# Patient Record
Sex: FEMALE | Race: BLACK OR AFRICAN AMERICAN | NOT HISPANIC OR LATINO | URBAN - METROPOLITAN AREA
[De-identification: names, ages, dates, MRNs, and addresses within clinical notes are randomized per-mention and may not be internally consistent; named-entity substitution may affect disease eponyms.]

---

## 2020-12-19 ENCOUNTER — INPATIENT (INPATIENT)
Facility: HOSPITAL | Age: 85
LOS: 2 days | Discharge: HOME | End: 2020-12-22
Attending: HOSPITALIST | Admitting: HOSPITALIST
Payer: MEDICARE

## 2020-12-19 VITALS
HEART RATE: 108 BPM | SYSTOLIC BLOOD PRESSURE: 167 MMHG | DIASTOLIC BLOOD PRESSURE: 95 MMHG | TEMPERATURE: 100 F | RESPIRATION RATE: 22 BRPM | OXYGEN SATURATION: 93 % | WEIGHT: 85.1 LBS

## 2020-12-19 DIAGNOSIS — Z89.619 ACQUIRED ABSENCE OF UNSPECIFIED LEG ABOVE KNEE: Chronic | ICD-10-CM

## 2020-12-19 LAB
ALBUMIN SERPL ELPH-MCNC: 3.7 G/DL — SIGNIFICANT CHANGE UP (ref 3.5–5.2)
ALP SERPL-CCNC: 74 U/L — SIGNIFICANT CHANGE UP (ref 30–115)
ALT FLD-CCNC: 21 U/L — SIGNIFICANT CHANGE UP (ref 0–41)
ANION GAP SERPL CALC-SCNC: 11 MMOL/L — SIGNIFICANT CHANGE UP (ref 7–14)
ANION GAP SERPL CALC-SCNC: 11 MMOL/L — SIGNIFICANT CHANGE UP (ref 7–14)
ANION GAP SERPL CALC-SCNC: 12 MMOL/L — SIGNIFICANT CHANGE UP (ref 7–14)
AST SERPL-CCNC: 17 U/L — SIGNIFICANT CHANGE UP (ref 0–41)
BASE EXCESS BLDV CALC-SCNC: 3.2 MMOL/L — HIGH (ref -2–2)
BASOPHILS # BLD AUTO: 0.03 K/UL — SIGNIFICANT CHANGE UP (ref 0–0.2)
BASOPHILS NFR BLD AUTO: 0.2 % — SIGNIFICANT CHANGE UP (ref 0–1)
BILIRUB SERPL-MCNC: 0.7 MG/DL — SIGNIFICANT CHANGE UP (ref 0.2–1.2)
BUN SERPL-MCNC: 36 MG/DL — HIGH (ref 10–20)
BUN SERPL-MCNC: 38 MG/DL — HIGH (ref 10–20)
BUN SERPL-MCNC: 44 MG/DL — HIGH (ref 10–20)
CA-I SERPL-SCNC: 1.17 MMOL/L — SIGNIFICANT CHANGE UP (ref 1.12–1.3)
CALCIUM SERPL-MCNC: 8.2 MG/DL — LOW (ref 8.5–10.1)
CALCIUM SERPL-MCNC: 8.3 MG/DL — LOW (ref 8.5–10.1)
CALCIUM SERPL-MCNC: 9.3 MG/DL — SIGNIFICANT CHANGE UP (ref 8.5–10.1)
CHLORIDE SERPL-SCNC: 122 MMOL/L — HIGH (ref 98–110)
CHLORIDE SERPL-SCNC: 122 MMOL/L — HIGH (ref 98–110)
CHLORIDE SERPL-SCNC: 123 MMOL/L — HIGH (ref 98–110)
CO2 SERPL-SCNC: 22 MMOL/L — SIGNIFICANT CHANGE UP (ref 17–32)
CO2 SERPL-SCNC: 22 MMOL/L — SIGNIFICANT CHANGE UP (ref 17–32)
CO2 SERPL-SCNC: 26 MMOL/L — SIGNIFICANT CHANGE UP (ref 17–32)
CREAT SERPL-MCNC: 0.6 MG/DL — LOW (ref 0.7–1.5)
CREAT SERPL-MCNC: 0.7 MG/DL — SIGNIFICANT CHANGE UP (ref 0.7–1.5)
CREAT SERPL-MCNC: 0.7 MG/DL — SIGNIFICANT CHANGE UP (ref 0.7–1.5)
D DIMER BLD IA.RAPID-MCNC: 1010 NG/ML DDU — HIGH (ref 0–230)
EOSINOPHIL # BLD AUTO: 0 K/UL — SIGNIFICANT CHANGE UP (ref 0–0.7)
EOSINOPHIL NFR BLD AUTO: 0 % — SIGNIFICANT CHANGE UP (ref 0–8)
GAS PNL BLDV: 164 MMOL/L — HIGH (ref 136–145)
GAS PNL BLDV: SIGNIFICANT CHANGE UP
GLUCOSE SERPL-MCNC: 126 MG/DL — HIGH (ref 70–99)
GLUCOSE SERPL-MCNC: 126 MG/DL — HIGH (ref 70–99)
GLUCOSE SERPL-MCNC: 90 MG/DL — SIGNIFICANT CHANGE UP (ref 70–99)
HCO3 BLDV-SCNC: 27 MMOL/L — SIGNIFICANT CHANGE UP (ref 22–29)
HCT VFR BLD CALC: 40.8 % — SIGNIFICANT CHANGE UP (ref 37–47)
HCT VFR BLDA CALC: 35.3 % — SIGNIFICANT CHANGE UP (ref 34–44)
HGB BLD CALC-MCNC: 11.5 G/DL — LOW (ref 14–18)
HGB BLD-MCNC: 12.2 G/DL — SIGNIFICANT CHANGE UP (ref 12–16)
IMM GRANULOCYTES NFR BLD AUTO: 0.7 % — HIGH (ref 0.1–0.3)
LACTATE BLDV-MCNC: 1.4 MMOL/L — SIGNIFICANT CHANGE UP (ref 0.5–1.6)
LACTATE SERPL-SCNC: 1.8 MMOL/L — SIGNIFICANT CHANGE UP (ref 0.7–2)
LDH SERPL L TO P-CCNC: 293 — HIGH (ref 50–242)
LYMPHOCYTES # BLD AUTO: 1.48 K/UL — SIGNIFICANT CHANGE UP (ref 1.2–3.4)
LYMPHOCYTES # BLD AUTO: 9.9 % — LOW (ref 20.5–51.1)
MCHC RBC-ENTMCNC: 26.7 PG — LOW (ref 27–31)
MCHC RBC-ENTMCNC: 29.9 G/DL — LOW (ref 32–37)
MCV RBC AUTO: 89.3 FL — SIGNIFICANT CHANGE UP (ref 81–99)
MONOCYTES # BLD AUTO: 0.59 K/UL — SIGNIFICANT CHANGE UP (ref 0.1–0.6)
MONOCYTES NFR BLD AUTO: 3.9 % — SIGNIFICANT CHANGE UP (ref 1.7–9.3)
NEUTROPHILS # BLD AUTO: 12.78 K/UL — HIGH (ref 1.4–6.5)
NEUTROPHILS NFR BLD AUTO: 85.3 % — HIGH (ref 42.2–75.2)
NRBC # BLD: 0 /100 WBCS — SIGNIFICANT CHANGE UP (ref 0–0)
NT-PROBNP SERPL-SCNC: 2382 PG/ML — HIGH (ref 0–300)
PCO2 BLDV: 38 MMHG — LOW (ref 41–51)
PH BLDV: 7.46 — HIGH (ref 7.26–7.43)
PLATELET # BLD AUTO: 181 K/UL — SIGNIFICANT CHANGE UP (ref 130–400)
PO2 BLDV: 53 MMHG — HIGH (ref 20–40)
POTASSIUM BLDV-SCNC: 3.8 MMOL/L — SIGNIFICANT CHANGE UP (ref 3.3–5.6)
POTASSIUM SERPL-MCNC: 3.9 MMOL/L — SIGNIFICANT CHANGE UP (ref 3.5–5)
POTASSIUM SERPL-MCNC: 4 MMOL/L — SIGNIFICANT CHANGE UP (ref 3.5–5)
POTASSIUM SERPL-MCNC: 4.6 MMOL/L — SIGNIFICANT CHANGE UP (ref 3.5–5)
POTASSIUM SERPL-SCNC: 3.9 MMOL/L — SIGNIFICANT CHANGE UP (ref 3.5–5)
POTASSIUM SERPL-SCNC: 4 MMOL/L — SIGNIFICANT CHANGE UP (ref 3.5–5)
POTASSIUM SERPL-SCNC: 4.6 MMOL/L — SIGNIFICANT CHANGE UP (ref 3.5–5)
PROT SERPL-MCNC: 7.5 G/DL — SIGNIFICANT CHANGE UP (ref 6–8)
RAPID RVP RESULT: SIGNIFICANT CHANGE UP
RBC # BLD: 4.57 M/UL — SIGNIFICANT CHANGE UP (ref 4.2–5.4)
RBC # FLD: 14.9 % — HIGH (ref 11.5–14.5)
SAO2 % BLDV: 89 % — SIGNIFICANT CHANGE UP
SARS-COV-2 RNA SPEC QL NAA+PROBE: SIGNIFICANT CHANGE UP
SODIUM SERPL-SCNC: 155 MMOL/L — HIGH (ref 135–146)
SODIUM SERPL-SCNC: 156 MMOL/L — HIGH (ref 135–146)
SODIUM SERPL-SCNC: 160 MMOL/L — HIGH (ref 135–146)
TROPONIN T SERPL-MCNC: <0.01 NG/ML — SIGNIFICANT CHANGE UP
WBC # BLD: 14.98 K/UL — HIGH (ref 4.8–10.8)
WBC # FLD AUTO: 14.98 K/UL — HIGH (ref 4.8–10.8)

## 2020-12-19 PROCEDURE — 99285 EMERGENCY DEPT VISIT HI MDM: CPT

## 2020-12-19 PROCEDURE — 93010 ELECTROCARDIOGRAM REPORT: CPT

## 2020-12-19 PROCEDURE — 70450 CT HEAD/BRAIN W/O DYE: CPT | Mod: 26

## 2020-12-19 PROCEDURE — 99232 SBSQ HOSP IP/OBS MODERATE 35: CPT

## 2020-12-19 PROCEDURE — 99223 1ST HOSP IP/OBS HIGH 75: CPT | Mod: AI

## 2020-12-19 PROCEDURE — 71045 X-RAY EXAM CHEST 1 VIEW: CPT | Mod: 26

## 2020-12-19 RX ORDER — AZITHROMYCIN 500 MG/1
500 TABLET, FILM COATED ORAL ONCE
Refills: 0 | Status: COMPLETED | OUTPATIENT
Start: 2020-12-19 | End: 2020-12-19

## 2020-12-19 RX ORDER — SODIUM CHLORIDE 9 MG/ML
1000 INJECTION INTRAMUSCULAR; INTRAVENOUS; SUBCUTANEOUS ONCE
Refills: 0 | Status: COMPLETED | OUTPATIENT
Start: 2020-12-19 | End: 2020-12-19

## 2020-12-19 RX ORDER — CEFTRIAXONE 500 MG/1
1000 INJECTION, POWDER, FOR SOLUTION INTRAMUSCULAR; INTRAVENOUS ONCE
Refills: 0 | Status: COMPLETED | OUTPATIENT
Start: 2020-12-19 | End: 2020-12-19

## 2020-12-19 RX ORDER — PANTOPRAZOLE SODIUM 20 MG/1
40 TABLET, DELAYED RELEASE ORAL
Refills: 0 | Status: DISCONTINUED | OUTPATIENT
Start: 2020-12-19 | End: 2020-12-22

## 2020-12-19 RX ORDER — INFLUENZA VIRUS VACCINE 15; 15; 15; 15 UG/.5ML; UG/.5ML; UG/.5ML; UG/.5ML
0.5 SUSPENSION INTRAMUSCULAR ONCE
Refills: 0 | Status: DISCONTINUED | OUTPATIENT
Start: 2020-12-19 | End: 2020-12-22

## 2020-12-19 RX ORDER — SODIUM CHLORIDE 9 MG/ML
1000 INJECTION, SOLUTION INTRAVENOUS
Refills: 0 | Status: DISCONTINUED | OUTPATIENT
Start: 2020-12-19 | End: 2020-12-19

## 2020-12-19 RX ORDER — ALBUTEROL 90 UG/1
1 AEROSOL, METERED ORAL EVERY 4 HOURS
Refills: 0 | Status: DISCONTINUED | OUTPATIENT
Start: 2020-12-19 | End: 2020-12-22

## 2020-12-19 RX ORDER — ACETAMINOPHEN 500 MG
650 TABLET ORAL ONCE
Refills: 0 | Status: DISCONTINUED | OUTPATIENT
Start: 2020-12-19 | End: 2020-12-19

## 2020-12-19 RX ORDER — CHLORHEXIDINE GLUCONATE 213 G/1000ML
1 SOLUTION TOPICAL
Refills: 0 | Status: DISCONTINUED | OUTPATIENT
Start: 2020-12-19 | End: 2020-12-22

## 2020-12-19 RX ORDER — ASPIRIN/CALCIUM CARB/MAGNESIUM 324 MG
1 TABLET ORAL
Qty: 0 | Refills: 0 | DISCHARGE

## 2020-12-19 RX ORDER — FUROSEMIDE 40 MG
1 TABLET ORAL
Qty: 0 | Refills: 0 | DISCHARGE

## 2020-12-19 RX ORDER — FLUTICASONE PROPIONATE 220 MCG
1 AEROSOL WITH ADAPTER (GRAM) INHALATION
Qty: 0 | Refills: 0 | DISCHARGE

## 2020-12-19 RX ORDER — ASPIRIN/CALCIUM CARB/MAGNESIUM 324 MG
81 TABLET ORAL DAILY
Refills: 0 | Status: DISCONTINUED | OUTPATIENT
Start: 2020-12-19 | End: 2020-12-22

## 2020-12-19 RX ORDER — OMEPRAZOLE 10 MG/1
1 CAPSULE, DELAYED RELEASE ORAL
Qty: 0 | Refills: 0 | DISCHARGE

## 2020-12-19 RX ORDER — ACETAMINOPHEN 500 MG
650 TABLET ORAL ONCE
Refills: 0 | Status: COMPLETED | OUTPATIENT
Start: 2020-12-19 | End: 2020-12-19

## 2020-12-19 RX ADMIN — Medication 650 MILLIGRAM(S): at 06:40

## 2020-12-19 RX ADMIN — CHLORHEXIDINE GLUCONATE 1 APPLICATION(S): 213 SOLUTION TOPICAL at 17:01

## 2020-12-19 RX ADMIN — AZITHROMYCIN 255 MILLIGRAM(S): 500 TABLET, FILM COATED ORAL at 12:06

## 2020-12-19 RX ADMIN — CEFTRIAXONE 100 MILLIGRAM(S): 500 INJECTION, POWDER, FOR SOLUTION INTRAMUSCULAR; INTRAVENOUS at 07:05

## 2020-12-19 RX ADMIN — SODIUM CHLORIDE 1000 MILLILITER(S): 9 INJECTION INTRAMUSCULAR; INTRAVENOUS; SUBCUTANEOUS at 05:14

## 2020-12-19 RX ADMIN — SODIUM CHLORIDE 50 MILLILITER(S): 9 INJECTION, SOLUTION INTRAVENOUS at 14:55

## 2020-12-19 NOTE — ED PROVIDER NOTE - ATTENDING CONTRIBUTION TO CARE
pt bib family for possible aspiration, choking episode 2 days ago, dec mental status.  febrile.   pt non verbal, lethargic, responds to tactile stim. no signs of trauma. b/l coarse breath sounds. rrr. ab soft. nd. b/l AKA.     will get labs, imaging, ekg. covid swab.

## 2020-12-19 NOTE — ED ADULT NURSE NOTE - NSIMPLEMENTINTERV_GEN_ALL_ED
Implemented All Fall Risk Interventions:  [place] to call system. Call bell, personal items and telephone within reach. Instruct patient to call for assistance. Room bathroom lighting operational. Non-slip footwear when patient is off stretcher. Physically safe environment: no spills, clutter or unnecessary equipment. Stretcher in lowest position, wheels locked, appropriate side rails in place. Provide visual cue, wrist band, yellow gown, etc. Monitor gait and stability. Monitor for mental status changes and reorient to person, place, and time. Review medications for side effects contributing to fall risk. Reinforce activity limits and safety measures with patient and family.

## 2020-12-19 NOTE — PHARMACOTHERAPY INTERVENTION NOTE - COMMENTS
Spoke with MD. Patient has had a unknown reaction to penicillin in the past. Since Ceftriaxone is a 3rd generation cephalosporin, MD is ok with giving ceftriaxone

## 2020-12-19 NOTE — H&P ADULT - HISTORY OF PRESENT ILLNESS
89 years old Female w/ PMhx of COPD, remote hx of brain tumor, b/l AKA presents with altered mental status from home.   Family notes 2 days ago, she seemed to have a choking episode. Since then, she has been waxing and waning in mental status, but they note she got worse today, gurgling and developing a fever. They deny vomiting/diarrhea, patient is now not talking, they note previously she sings/talks with them.    In the ED, Temp 101.2, , /95, RR 22 saturating 93% on RA and titrated to 96% w/ 3L NC. Labs significant for elevated WBC 14.98, Na 160, proBNP 2382, CXR shows bilateral lung opacities with left lower lobe consolidation. CTH shows 5 cm partially calcified retroclival soft tissue mass with associated bony erosion of the clivus and mass effect upon the brainstem. This may contribute to the ventricular dilatation. The differential for this finding includes chordoma versus meningioma. Pt received 1L NS bolus, ceftriaxone / azithromycin, tylenol for fever in ED. Neurosurgery consulted and recommended Keppra and MRI of brain. Pt to be admitted to medicine for further workup.   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   As per family, pt has been lethargic and not eating well since yesterday prompting the family to bring the patient to the hospital. At baseline pt is AAO x2, is awake, alert, speaks back to family members, watches TV but no oriented to time and place. Pt has a visiting home doctor who visits the patient once every month and visiting nurse who visits the patient once every week. At baseline pt tolerated regular meals and ensure supplements were added by visiting nurse, but recently family has noticed difficulty with swallowing and wanted to give soft puree diet.   Family is unsure about brain mass history but reports it was "benign mass" although patient has history of malignant brain tumor in her siblings. Also are not fully sure about the reason for b/l above knee amputations as per them the reason was "erosion of metallic prosthesis in in right ankle leading to gangrene that spread to left ankle ending up in b/l AKA".   Family denies any observed fever, SOB. n/v/d, weight changes or chest pain     In the ED, Temp 101.2, , /95, RR 22 saturating 93% on RA and titrated to 96% w/ 3L NC. Labs significant for elevated WBC 14.98, Na 160, proBNP 2382, CXR shows bilateral lung opacities with left lower lobe consolidation. CTH shows 5 cm partially calcified retroclival soft tissue mass with associated bony erosion of the clivus and mass effect upon the brainstem. This may contribute to the ventricular dilatation. The differential for this finding includes chordoma versus meningioma. Pt received 1L NS bolus, ceftriaxone / azithromycin, tylenol for fever in ED. Neurosurgery consulted and recommended Keppra and MRI of brain. Pt to be admitted to medicine for further workup.

## 2020-12-19 NOTE — H&P ADULT - NSHPLABSRESULTS_GEN_ALL_CORE
(12-19 @ 02:44)                      12.2  14.98 )-----------( 181                 40.8    Neutrophils = 12.78 (85.3%)  Lymphocytes = 1.48 (9.9%)  Eosinophils = 0.00 (0.0%)  Basophils = 0.03 (0.2%)  Monocytes = 0.59 (3.9%)  Bands = --%    12-19    160<H>  |  122<H>  |  44<H>  ----------------------------<  126<H>  4.6   |  26  |  0.7    Ca    9.3      19 Dec 2020 02:44    TPro  7.5  /  Alb  3.7  /  TBili  0.7  /  DBili  x   /  AST  17  /  ALT  21  /  AlkPhos  74  12-19      CARDIAC MARKERS ( 19 Dec 2020 02:44 )  Trop <0.01 ng/mL / CK x     / CKMB x           RVP:(12-19 @ 02:54)  [facility]      Venous Blood Gas:  12-19 @ 03:57  7.46/38/53/27/89  VBG Lactate: 1.4        < from: Xray Chest 1 View-PORTABLE IMMEDIATE (12.19.20 @ 05:13) >    mpression:    Bilateral opacities..    < end of copied text >    < from: CT Head No Cont (12.19.20 @ 05:00) >    5 cm partially calcified retroclival soft tissue mass with associated bony erosion of the clivus and mass effect upon the brainstem. This may contribute to the ventricular dilatation. The differential for this finding includes chordoma versus meningioma. Further evaluation with contrast-enhanced MRI is recommended.    Partially opacified bilateral mastoid air cells.    < end of copied text >

## 2020-12-19 NOTE — CONSULT NOTE ADULT - ATTENDING COMMENTS
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 lesion has been known to family for many years. No historical imaging for comparison.  Surgical intervention is not indicated in this medically fragile 89 year old. Grandaughter is relieved and in agreement. She would like to get her grandmother home to care for her at home ASAP. We are in agreement that MRI is unnecessary given no planned surgical intervention. This was relayed to resident. Reconsult as needed.

## 2020-12-19 NOTE — ED PROVIDER NOTE - OBJECTIVE STATEMENT
89F hx COPD, remote hx of brain tumor, b/l AKA presents with altered mental status from home. Family notes 2 days ago, she seemed to have a choking episode. Since then, she has been waxing and waning in mental status, but they note she got worse today, gurgling and developing a fever. They deny vomiting/diarrhea, patient is now not talking, they note previously she sings/talks with them.

## 2020-12-19 NOTE — ED PROVIDER NOTE - CARE PLAN
Principal Discharge DX:	Aspiration pneumonia  Secondary Diagnosis:	Altered mental status  Secondary Diagnosis:	Brain mass

## 2020-12-19 NOTE — CONSULT NOTE ADULT - ASSESSMENT
89 y.o F with CTH findings of 5 cm partially calcified retroclival soft tissue mass with associated bony erosion of the clivus and mass effect upon the brainstem. This may contribute to the ventricular dilatation. The differential for this finding includes chordoma versus meningioma.        Plan:  Family want to proceed with MRI evaluation, but refusing surgical intervention at this time   Please obtain MRI of the head w/wo IVC  Keppra 1000 mg IV x onec cont. 500 mg BID   Neurology r/o seizure activity   NSGY attending will F/U

## 2020-12-19 NOTE — H&P ADULT - NSHPPHYSICALEXAM_GEN_ALL_CORE
PHYSICAL EXAM:  GENERAL: NAD, lying in bed comfortably  HEAD:  Atraumatic, Normocephalic  EYES: EOMI, PERRLA, conjunctiva and sclera clear  ENT: Moist mucous membranes  NECK: Supple, No JVD  CHEST/LUNG: Clear to auscultation bilaterally; No rales, rhonchi, wheezing, or rubs. Unlabored respirations  HEART: Regular rate and rhythm; No murmurs, rubs, or gallops  ABDOMEN: Bowel sounds present; Soft, Nontender, Nondistended. No hepatomegally  EXTREMITIES:  2+ Peripheral Pulses, brisk capillary refill. No clubbing, cyanosis, or edema  NERVOUS SYSTEM:  Alert & Oriented X3, speech clear. No deficits   MSK: FROM all 4 extremities, full and equal strength  SKIN: No rashes or lesions PHYSICAL EXAM:  GENERAL: NAD, lying in bed comfortably  HEAD:  Atraumatic, Normocephalic  EYES: EOMI, PERRLA, conjunctiva and sclera clear  ENT: Moist mucous membranes  NECK: Supple, No JVD  CHEST/LUNG: decreased bs in b/l bases  HEART: Regular rate and rhythm; No murmurs, rubs, or gallops  ABDOMEN: Bowel sounds present; Soft, Nontender, Nondistended. No hepatomegally  EXTREMITIES:  AKA  NERVOUS SYSTEM:  Alert & Oriented X1, speech clear. No deficits   SKIN: No rashes or lesions

## 2020-12-19 NOTE — ED ADULT TRIAGE NOTE - CHIEF COMPLAINT QUOTE
pt biba for possible aspiration. as per family member the patient spit up and now she sounds all congested. pt is non verbal which is baseline for patient.

## 2020-12-19 NOTE — H&P ADULT - ATTENDING COMMENTS
**Hx and physical limited due to AMS. Supplemental information obtained from house staff, EMR, and family (granddaughter; Crystal) at bedside.     89 YO F with a PMH of COPD (not on home O2), obesity, HF (unknown type), remote hx of benign brain tumor (unknown type), and b/l AKA who was BIBEMS with her family for eval of AMS for the past x 2 days. Associated with decreased PO intake. Family denies any cough, fevers/chills, or N/V/D. No COVID contact (family "doesn't leave home"). In the ED, Chest X-Ray showed B/L opacities. Cultures sent, started on IVFs/IV ABXs (Ceftriaxone/Azithro). Na noted to be 160, given IVFs. CTH showed an retroclival mass. Neurosurgery consulted and states no need for surgical intervention and due to chronicity of mass, no need for MRI. COVID swab was negative.     Physical exam shows obese pt in NAD. VSS, afebrile, not hypoxic on 2L NC. Dry mucous membranes. A&Ox0, Pt is alert but will not speak to me, only to her granddaughter at bedside. Inable to assess neuro exam. CTA B/L with no W/C/R. RRR, no M/G/R. ABD is soft and non-tender, normoactive BSs. LEs with B/L AKAs. No rashes. Labs and radiology as above.     Metabolic encephalopathy likely from aspiration pneumonia, less likely COVID; sepsis present on admission. IV ABXs (Ceftriaxone/Azithromycin). IVFs (LR). FU cultures. Anti-tussives PRN. Send Procal/CRP. ASpiration precautions. Speech/Swallow eval  -Family would like pt discharged home ASAP, I informed them that she will likely need IV ABXs if she is unable to swallow.   -Might need to place midline and set up home VNS    Hypernatremia from decreased PO intake. IVFs (LR). Repeat BMP in the AM. Send UA and urine studies.     Retroclival mass, chronic. Family knows about this mass and is not interested in pursuing it further. Neurosurgery has signed off the case. No MRI.     Hx of COPD (not on home O2), obesity, HF (unknown type), remote hx of benign brain tumor (unknown type), and b/l AKA. Restart home meds, hold home lasix. DVT PPX. Inform PCP of pt's admission to hospital. My note supersedes the residents note.

## 2020-12-19 NOTE — ED ADULT NURSE NOTE - OBJECTIVE STATEMENT
89yr old female presents w/ grandduaghter due to change in mental status. pt used to sign and be vocal as of yesterday pt is nonverbal and somnolent.

## 2020-12-19 NOTE — ED PROVIDER NOTE - NS ED ROS FT
Constitutional: (+) fever  Eyes/ENT: (-) blurry vision, (-) epistaxis  Cardiovascular: (-) chest pain, (-) syncope  Respiratory: (-) cough, (-) shortness of breath  Gastrointestinal: (-) vomiting, (-) diarrhea  Musculoskeletal: (-) neck pain, (-) back pain, (-) joint pain  Integumentary: (-) rash, (-) edema  Neurological: (-) headache, (+) altered mental status  Psychiatric: (-) hallucinations  Allergic/Immunologic: (-) pruritus

## 2020-12-19 NOTE — ED PROVIDER NOTE - PHYSICAL EXAMINATION
Vital Signs: I have reviewed the initial vital signs.  Constitutional: well-nourished, appears stated age, no acute distress.  HEENT: Airway patent, protected. No pooling of saliva, pupils 3mm bilaterally, sluggish.   CV: regular rate, regular rhythm, well-perfused extremities, 2+ b/l DP and radial pulses equal.  Lungs: audible crackles, no increased WOB.  ABD: soft, NTND, no guarding or rebound, no pulsatile mass, no hernias.   INTEG: Skin warm, dry, no rash.  NEURO: A&Ox0, moving all extremities to noxious stimuli, nonverbal  PSYCH: Calm, cooperative, normal affect and interaction.

## 2020-12-19 NOTE — H&P ADULT - ASSESSMENT
# Acute metabolic encephalopathy  # DDX: Brain mass, sepsis secondary to aspiration PNA, Hypernatremia     # Brain mass  - CTH shows 5 cm partially calcified retroclival soft tissue mass with associated bony erosion of the clivus and mass effect upon the brainstem. This may contribute to the ventricular dilatation. The differential for this finding includes chordoma versus meningioma. Further evaluation with contrast-enhanced MRI is recommended.  - start keppra and decardron  - c/s neurology  - f/u neurosurgery    # Hypernatremia possibly secondary to hypovolemia as elevated BUN  - urine osm, U Na, Cr, Uk, FeNa  - calculated water deficit is 1.1 L  - chronic - lower by 8-10mmol/day, via water deficit  plus 50cc/hour forinsensible losses  - strict I and Os  - acute 1mmol/hr for first 6/8 hours  - D5w  - thiazide diuretcis if NDI,   - decrease salt intake for hypervolemic  - f/u BMP    # COPD  # hx of AKA? PVD?    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     # Acute metabolic encephalopathy  # DDX: Brain mass, sepsis secondary to aspiration PNA, Hypernatremia     # Aspiration PNA / sepsis  - SIRS +, CXR shows bilateral lung opacities with left lower lobe consolidation.  - rapid covid 12/9 negative  - start levofloxacin 750mg IV q24 as allergic to penicillin  - f/u procalcitonin, LDH, CRP, D-Dimer  - order UA, UCX, BCX  - consult ID    # Chronic brain mass - DDX chordoma vs meningioma  - CTH shows 5 cm partially calcified retroclival soft tissue mass with associated bony erosion of the clivus and mass effect upon the brainstem. This may contribute to the ventricular dilatation. The differential for this finding includes chordoma versus meningioma. Further evaluation with contrast-enhanced MRI is recommended.  - spoke w/ NSX attending - family doesn't want any procedure so no MRI or keppra at the moment  - f/u neurosurgery  - EEG to r/o seizure,     # Hypernatremia possibly secondary to hypovolemia as elevated BUN  - unsure if acute vs chronic, no previous records available.   - urine osm, U Na, Cr, Uk, FeNa  - calculated water deficit is 1.1 L  - strict I and Os  - D5w @ 50cc, goal is to lower by 8mmol / day  - stop diuretics, pt takes lasix at home  - f/u BMP    # HF w/ unknow ejection fraction ; CXR b/l opacities L > R, BNP elevated, looks dry on exam. will hold lasix for now due to hypernatremia.   # COPD - c/w home inhalers  # hx of AKA - PVD - c/w home ASA    # Diet: NPO, speech and swallow  # DVT Prophylaxis: n/a for now as brain mass  # GI Prophylaxis: Protonix  # Activity: IAT  # IV Fluids: D5W  # Dispo: Acute  # Code Status: Fullcode, d/w daughter Comfort, she wants fullcode.

## 2020-12-20 LAB
ALBUMIN SERPL ELPH-MCNC: 3.1 G/DL — LOW (ref 3.5–5.2)
ALP SERPL-CCNC: 67 U/L — SIGNIFICANT CHANGE UP (ref 30–115)
ALT FLD-CCNC: 13 U/L — SIGNIFICANT CHANGE UP (ref 0–41)
ANION GAP SERPL CALC-SCNC: 10 MMOL/L — SIGNIFICANT CHANGE UP (ref 7–14)
ANION GAP SERPL CALC-SCNC: 11 MMOL/L — SIGNIFICANT CHANGE UP (ref 7–14)
ANION GAP SERPL CALC-SCNC: 11 MMOL/L — SIGNIFICANT CHANGE UP (ref 7–14)
ANION GAP SERPL CALC-SCNC: 13 MMOL/L — SIGNIFICANT CHANGE UP (ref 7–14)
APTT BLD: 24.8 SEC — LOW (ref 27–39.2)
AST SERPL-CCNC: 9 U/L — SIGNIFICANT CHANGE UP (ref 0–41)
BASOPHILS # BLD AUTO: 0.02 K/UL — SIGNIFICANT CHANGE UP (ref 0–0.2)
BASOPHILS NFR BLD AUTO: 0.1 % — SIGNIFICANT CHANGE UP (ref 0–1)
BILIRUB SERPL-MCNC: 0.5 MG/DL — SIGNIFICANT CHANGE UP (ref 0.2–1.2)
BUN SERPL-MCNC: 27 MG/DL — HIGH (ref 10–20)
BUN SERPL-MCNC: 33 MG/DL — HIGH (ref 10–20)
BUN SERPL-MCNC: 34 MG/DL — HIGH (ref 10–20)
BUN SERPL-MCNC: 34 MG/DL — HIGH (ref 10–20)
CALCIUM SERPL-MCNC: 8.2 MG/DL — LOW (ref 8.5–10.1)
CALCIUM SERPL-MCNC: 8.5 MG/DL — SIGNIFICANT CHANGE UP (ref 8.5–10.1)
CALCIUM SERPL-MCNC: 8.6 MG/DL — SIGNIFICANT CHANGE UP (ref 8.5–10.1)
CALCIUM SERPL-MCNC: 8.7 MG/DL — SIGNIFICANT CHANGE UP (ref 8.5–10.1)
CHLORIDE SERPL-SCNC: 118 MMOL/L — HIGH (ref 98–110)
CHLORIDE SERPL-SCNC: 124 MMOL/L — HIGH (ref 98–110)
CHLORIDE SERPL-SCNC: 125 MMOL/L — HIGH (ref 98–110)
CHLORIDE SERPL-SCNC: 126 MMOL/L — HIGH (ref 98–110)
CHOLEST SERPL-MCNC: 180 MG/DL — SIGNIFICANT CHANGE UP
CO2 SERPL-SCNC: 22 MMOL/L — SIGNIFICANT CHANGE UP (ref 17–32)
CO2 SERPL-SCNC: 22 MMOL/L — SIGNIFICANT CHANGE UP (ref 17–32)
CO2 SERPL-SCNC: 23 MMOL/L — SIGNIFICANT CHANGE UP (ref 17–32)
CO2 SERPL-SCNC: 24 MMOL/L — SIGNIFICANT CHANGE UP (ref 17–32)
CREAT SERPL-MCNC: 0.5 MG/DL — LOW (ref 0.7–1.5)
CREAT SERPL-MCNC: 0.6 MG/DL — LOW (ref 0.7–1.5)
CRP SERPL-MCNC: 14.54 MG/DL — HIGH (ref 0–0.4)
EOSINOPHIL # BLD AUTO: 0.04 K/UL — SIGNIFICANT CHANGE UP (ref 0–0.7)
EOSINOPHIL NFR BLD AUTO: 0.3 % — SIGNIFICANT CHANGE UP (ref 0–8)
GLUCOSE SERPL-MCNC: 113 MG/DL — HIGH (ref 70–99)
GLUCOSE SERPL-MCNC: 89 MG/DL — SIGNIFICANT CHANGE UP (ref 70–99)
GLUCOSE SERPL-MCNC: 90 MG/DL — SIGNIFICANT CHANGE UP (ref 70–99)
GLUCOSE SERPL-MCNC: 93 MG/DL — SIGNIFICANT CHANGE UP (ref 70–99)
HCT VFR BLD CALC: 33.9 % — LOW (ref 37–47)
HDLC SERPL-MCNC: 52 MG/DL — SIGNIFICANT CHANGE UP
HGB BLD-MCNC: 10.1 G/DL — LOW (ref 12–16)
IMM GRANULOCYTES NFR BLD AUTO: 0.6 % — HIGH (ref 0.1–0.3)
INR BLD: 1.41 RATIO — HIGH (ref 0.65–1.3)
LIPID PNL WITH DIRECT LDL SERPL: 109 MG/DL — HIGH
LYMPHOCYTES # BLD AUTO: 1.3 K/UL — SIGNIFICANT CHANGE UP (ref 1.2–3.4)
LYMPHOCYTES # BLD AUTO: 9.1 % — LOW (ref 20.5–51.1)
MAGNESIUM SERPL-MCNC: 2.3 MG/DL — SIGNIFICANT CHANGE UP (ref 1.8–2.4)
MCHC RBC-ENTMCNC: 27.2 PG — SIGNIFICANT CHANGE UP (ref 27–31)
MCHC RBC-ENTMCNC: 29.8 G/DL — LOW (ref 32–37)
MCV RBC AUTO: 91.1 FL — SIGNIFICANT CHANGE UP (ref 81–99)
MONOCYTES # BLD AUTO: 0.61 K/UL — HIGH (ref 0.1–0.6)
MONOCYTES NFR BLD AUTO: 4.3 % — SIGNIFICANT CHANGE UP (ref 1.7–9.3)
NEUTROPHILS # BLD AUTO: 12.26 K/UL — HIGH (ref 1.4–6.5)
NEUTROPHILS NFR BLD AUTO: 85.6 % — HIGH (ref 42.2–75.2)
NON HDL CHOLESTEROL: 128 MG/DL — SIGNIFICANT CHANGE UP
NRBC # BLD: 0 /100 WBCS — SIGNIFICANT CHANGE UP (ref 0–0)
PLATELET # BLD AUTO: 128 K/UL — LOW (ref 130–400)
POTASSIUM SERPL-MCNC: 3.4 MMOL/L — LOW (ref 3.5–5)
POTASSIUM SERPL-MCNC: 3.5 MMOL/L — SIGNIFICANT CHANGE UP (ref 3.5–5)
POTASSIUM SERPL-MCNC: 3.8 MMOL/L — SIGNIFICANT CHANGE UP (ref 3.5–5)
POTASSIUM SERPL-MCNC: 3.9 MMOL/L — SIGNIFICANT CHANGE UP (ref 3.5–5)
POTASSIUM SERPL-SCNC: 3.4 MMOL/L — LOW (ref 3.5–5)
POTASSIUM SERPL-SCNC: 3.5 MMOL/L — SIGNIFICANT CHANGE UP (ref 3.5–5)
POTASSIUM SERPL-SCNC: 3.8 MMOL/L — SIGNIFICANT CHANGE UP (ref 3.5–5)
POTASSIUM SERPL-SCNC: 3.9 MMOL/L — SIGNIFICANT CHANGE UP (ref 3.5–5)
PROCALCITONIN SERPL-MCNC: 0.12 NG/ML — HIGH (ref 0.02–0.1)
PROT SERPL-MCNC: 6 G/DL — SIGNIFICANT CHANGE UP (ref 6–8)
PROTHROM AB SERPL-ACNC: 16.2 SEC — HIGH (ref 9.95–12.87)
RBC # BLD: 3.72 M/UL — LOW (ref 4.2–5.4)
RBC # FLD: 14.9 % — HIGH (ref 11.5–14.5)
SARS-COV-2 RNA SPEC QL NAA+PROBE: SIGNIFICANT CHANGE UP
SODIUM SERPL-SCNC: 150 MMOL/L — HIGH (ref 135–146)
SODIUM SERPL-SCNC: 159 MMOL/L — HIGH (ref 135–146)
SODIUM SERPL-SCNC: 160 MMOL/L — HIGH (ref 135–146)
SODIUM SERPL-SCNC: 160 MMOL/L — HIGH (ref 135–146)
TRIGL SERPL-MCNC: 78 MG/DL — SIGNIFICANT CHANGE UP
WBC # BLD: 14.31 K/UL — HIGH (ref 4.8–10.8)
WBC # FLD AUTO: 14.31 K/UL — HIGH (ref 4.8–10.8)

## 2020-12-20 PROCEDURE — 99233 SBSQ HOSP IP/OBS HIGH 50: CPT

## 2020-12-20 RX ORDER — SODIUM CHLORIDE 9 MG/ML
1000 INJECTION, SOLUTION INTRAVENOUS
Refills: 0 | Status: DISCONTINUED | OUTPATIENT
Start: 2020-12-20 | End: 2020-12-20

## 2020-12-20 RX ORDER — CEFTRIAXONE 500 MG/1
1000 INJECTION, POWDER, FOR SOLUTION INTRAMUSCULAR; INTRAVENOUS EVERY 24 HOURS
Refills: 0 | Status: DISCONTINUED | OUTPATIENT
Start: 2020-12-21 | End: 2020-12-22

## 2020-12-20 RX ORDER — CEFTRIAXONE 500 MG/1
INJECTION, POWDER, FOR SOLUTION INTRAMUSCULAR; INTRAVENOUS
Refills: 0 | Status: DISCONTINUED | OUTPATIENT
Start: 2020-12-20 | End: 2020-12-22

## 2020-12-20 RX ORDER — AZITHROMYCIN 500 MG/1
500 TABLET, FILM COATED ORAL EVERY 24 HOURS
Refills: 0 | Status: DISCONTINUED | OUTPATIENT
Start: 2020-12-20 | End: 2020-12-22

## 2020-12-20 RX ORDER — CEFTRIAXONE 500 MG/1
1000 INJECTION, POWDER, FOR SOLUTION INTRAMUSCULAR; INTRAVENOUS ONCE
Refills: 0 | Status: COMPLETED | OUTPATIENT
Start: 2020-12-20 | End: 2020-12-20

## 2020-12-20 RX ORDER — SODIUM CHLORIDE 9 MG/ML
1000 INJECTION, SOLUTION INTRAVENOUS
Refills: 0 | Status: DISCONTINUED | OUTPATIENT
Start: 2020-12-20 | End: 2020-12-21

## 2020-12-20 RX ORDER — POTASSIUM CHLORIDE 20 MEQ
40 PACKET (EA) ORAL ONCE
Refills: 0 | Status: DISCONTINUED | OUTPATIENT
Start: 2020-12-20 | End: 2020-12-21

## 2020-12-20 RX ADMIN — AZITHROMYCIN 255 MILLIGRAM(S): 500 TABLET, FILM COATED ORAL at 13:20

## 2020-12-20 RX ADMIN — PANTOPRAZOLE SODIUM 40 MILLIGRAM(S): 20 TABLET, DELAYED RELEASE ORAL at 06:33

## 2020-12-20 RX ADMIN — CEFTRIAXONE 100 MILLIGRAM(S): 500 INJECTION, POWDER, FOR SOLUTION INTRAMUSCULAR; INTRAVENOUS at 13:20

## 2020-12-20 RX ADMIN — SODIUM CHLORIDE 50 MILLILITER(S): 9 INJECTION, SOLUTION INTRAVENOUS at 09:41

## 2020-12-20 RX ADMIN — CHLORHEXIDINE GLUCONATE 1 APPLICATION(S): 213 SOLUTION TOPICAL at 06:33

## 2020-12-20 RX ADMIN — SODIUM CHLORIDE 75 MILLILITER(S): 9 INJECTION, SOLUTION INTRAVENOUS at 13:21

## 2020-12-20 NOTE — PROGRESS NOTE ADULT - SUBJECTIVE AND OBJECTIVE BOX
EDDIE ECHOLS 89y Female  MRN#: [id number]   Hospital Day: 1d    SUBJECTIVE  Patient is a 89y old Female who presents with a chief complaint of Altered mental status (19 Dec 2020 11:08)  Currently admitted to medicine with the primary diagnosis of Aspiration pneumonia      INTERVAL HPI AND OVERNIGHT EVENTS:  Patient was examined and seen at bedside. This morning she is resting comfortably in bed and reports no issues or overnight events.    REVIEW OF SYMPTOMS: Unable to obtain due to patient's mental status      OBJECTIVE  PAST MEDICAL & SURGICAL HISTORY  Brain mass    COPD, mild    Above-knee amputee      ALLERGIES:  penicillin (Anaphylaxis)    MEDICATIONS:  STANDING MEDICATIONS  aspirin  chewable 81 milliGRAM(s) Oral daily  chlorhexidine 4% Liquid 1 Application(s) Topical <User Schedule>  dextrose 5%. 1000 milliLiter(s) IV Continuous <Continuous>  influenza   Vaccine 0.5 milliLiter(s) IntraMuscular once  levoFLOXacin IVPB 750 milliGRAM(s) IV Intermittent every 24 hours  pantoprazole    Tablet 40 milliGRAM(s) Oral before breakfast    PRN MEDICATIONS  ALBUTerol    90 MICROgram(s) HFA Inhaler 1 Puff(s) Inhalation every 4 hours PRN      VITAL SIGNS: Last 24 Hours  T(C): 37.3 (20 Dec 2020 05:01), Max: 37.4 (19 Dec 2020 07:40)  T(F): 99.2 (20 Dec 2020 05:01), Max: 99.4 (19 Dec 2020 07:40)  HR: 91 (20 Dec 2020 05:01) (91 - 96)  BP: 108/59 (20 Dec 2020 05:01) (92/52 - 108/59)  BP(mean): --  RR: 19 (20 Dec 2020 05:01) (18 - 19)  SpO2: 97% (19 Dec 2020 10:23) (97% - 99%)    LABS:                        10.1   14.31 )-----------( 128      ( 20 Dec 2020 05:50 )             33.9     12-20    160<H>  |  125<H>  |  34<H>  ----------------------------<  90  3.5   |  24  |  0.6<L>    Ca    8.6      20 Dec 2020 05:50  Mg     2.3     12-20    TPro  6.0  /  Alb  3.1<L>  /  TBili  0.5  /  DBili  x   /  AST  9   /  ALT  13  /  AlkPhos  67  12-20    PT/INR - ( 20 Dec 2020 05:50 )   PT: 16.20 sec;   INR: 1.41 ratio         PTT - ( 20 Dec 2020 05:50 )  PTT:24.8 sec          CARDIAC MARKERS ( 19 Dec 2020 02:44 )  x     / <0.01 ng/mL / x     / x     / x          RADIOLOGY:      PHYSICAL EXAM:  CONSTITUTIONAL: AMS   HEAD: Atraumatic, normocephalic  EYES: EOM intact, PERRLA, conjunctiva and sclera clear  PULMONARY: Clear to auscultation bilaterally  CARDIOVASCULAR: Regular rate and rhythm  GASTROINTESTINAL: Soft, non-tender, non-distended  MUSCULOSKELETAL:  no edema  NEUROLOGY: unable to obtain   SKIN: No rashes or lesions; warm and dry    ASSESSMENT & PLAN  89 years old Female with PMhx of COPD (not on home O2), remote hx of benign brain tumor (unknown type), 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     # Acute metabolic encephalopathy  # DDX: Brain mass, sepsis secondary to aspiration PNA, Hypernatremia   # Aspiration PNA / sepsis  - SIRS +, CXR shows bilateral lung opacities with left lower lobe consolidation.  - rapid covid 12/9 negative  - start levofloxacin 750mg IV q24 as allergic to penicillin  - f/u procalcitonin,  CRP  Lactate Dehydrogenase, Serum (12.19.20 @ 16:00)    Lactate Dehydrogenase, Serum: 293: Hemolyzed. Interpret with caution  -D-Dimer Assay, Quantitative: 1010: Manufacturers recommended Cut off for VTE is 230 ng/ml D-DU ng/mL DDU (12.19.20 @ 16:00)  - Blood cultures collected, pending results  - pending collection UA/Urine culture   - ID consult pending     # Chronic brain mass - DDX chordoma vs meningioma  - CTH shows 5 cm partially calcified retroclival soft tissue mass with associated bony erosion of the clivus and mass effect upon the brainstem. This may contribute to the ventricular dilatation. The differential for this finding includes chordoma versus meningioma. Further evaluation with contrast-enhanced MRI is recommended.  - spoke w/ NSX attending - family doesn't want any procedure so no MRI or keppra at the moment  - EEG to r/o seizure  - monitor hypernatremia on D5W     # Hypernatremia possibly secondary to hypovolemia as elevated BUN  - unsure if acute vs chronic, no previous records available.   - urine osm, U Na, Cr, Uk, FeNa  - calculated water deficit is 1.1 L  - strict I and Os  - D5w @ 50cc, goal is to lower by 8mmol / day, 160 --> 155 (fluids stopped)--> 156 --> 160 (fluids restarted)  - f/u BMP q8hr     # HF w/ unknown ejection fraction ; CXR b/l opacities L > R, BNP elevated, looks dry on exam. will hold lasix for now due to hypernatremia.   # COPD - c/w home inhalers  # hx of AKA - PVD - c/w home ASA     Diet: NPO, speech and swallow   DVT Prophylaxis: n/a for now as brain mass   GI Prophylaxis: Protonix   Activity: IAT   IV Fluids: D5W  Dispo: Acute  Code Status: Fullcode, d/w daughter Comfort, she wants fullcode.

## 2020-12-20 NOTE — PROGRESS NOTE ADULT - ATTENDING COMMENTS
Patient seen and examined    #Aspiration Pneumonia, sepsis present on admission: ID recommendations noted, aspiration precautions, awaiting speech and swallow for modified diet and precautions     #Metabolic encephalopathy likely from aspiration pneumonia vs Hypernatremia: continue maintenance fluid until speech and swallow seen, free water deficit of 2.5L, free water supplement 400cc/Q4H, check 20:00 BMP    #Chronic Retroclival mass: no further workup as per family and neurosurgery     #COPD (not on home O2) stable, Unspecified CHF: strict intake and outputs, no sign of overt volume overload, lasix on hold     Disposition: Acute Patient seen and examined, discussed case with patient's daughter, Comfort, over the phone, patient alert and oriented to self on interview, denies any complaints.    #Aspiration Pneumonia, sepsis present on admission: ID recommendations noted, aspiration precautions, awaiting speech and swallow for modified diet and precautions     #Metabolic encephalopathy likely from aspiration pneumonia vs Hypernatremia: continue maintenance fluid until speech and swallow seen, free water deficit of 2.5L, free water supplement 400cc/Q4H, check 20:00 BMP    #Chronic Retroclival mass: no further workup as per family and neurosurgery     #COPD (not on home O2) stable, Unspecified CHF: strict intake and outputs, no sign of overt volume overload, lasix on hold     Disposition: Acute [id number]

## 2020-12-20 NOTE — SWALLOW BEDSIDE ASSESSMENT ADULT - COMMENTS
pt received lethargic. O2 via NC. +ronchus.   as per granddaughter, pt with functional cognition, diet toleration.

## 2020-12-20 NOTE — CONSULT NOTE ADULT - SUBJECTIVE AND OBJECTIVE BOX
Patient is a 89y old  Female who presents with a chief complaint of     HPI:  89 y.o F with PMH of [place], COPD, B/L AKA (2015)  and known to family benign brain mass diagnosed about 20 years ago brought to ED with AMS since yesterday AM, not talking, Pt. was not able to swallow food while eating , just chewing and spitting, aphasic , at ED tachycardic and febrile 101.2 NSGY called to evaluate PT for CTH finding of 5 cm partially calcified retroclival soft tissue mass with associated bony erosion of the clivus and mass effect upon the brainstem. This may contribute to the ventricular dilatation. The differential for this finding includes chordoma versus meningioma. MRI recommended.   Pt. opens eyes and grimacing to noxious stimuli and sternal rub, shaking noted.    D/w family over the phone Pt' s family aware about brain mass stating it was benign known x 20 years.         PAST MEDICAL & SURGICAL HISTORY:  [place], COPD, B/L AKA (2015)  and known to family benign brain mass diagnosed about 20 years ago    REVIEW OF SYSTEMS:    CONSTITUTIONAL:  + fevers or chills  HEENT: No visual changes  ENDO: No sweating  NECK: No pain or stiffness  MUSCULOSKELETAL: No back pain, no joint pain  RESPIRATORY: No shortness of breath  CARDIOVASCULAR: No chest pain  GASTROINTESTINAL: No abdominal or epigastric pain. No nausea, vomiting,  No diarrhea or constipation.   NEUROLOGICAL: No mental status changes  PSYCH: No depression, no mood changes  SKIN: No itching      MEDICATIONS  (STANDING):  azithromycin  IVPB 500 milliGRAM(s) IV Intermittent once  ASA 81 mg       Allergies: penicillin (Anaphylaxis)         SOCIAL HISTORY: No illicit drug use     FAMILY HISTORY:  family h/x of brain tumors     Vital Signs Last 24 Hrs  T(C): 38.4 (19 Dec 2020 02:53), Max: 38.4 (19 Dec 2020 02:53)  T(F): 101.2 (19 Dec 2020 02:53), Max: 101.2 (19 Dec 2020 02:53)  HR: 92 (19 Dec 2020 06:05) (92 - 108)  BP: 107/59 (19 Dec 2020 06:05) (107/59 - 167/95)  RR: 18 (19 Dec 2020 06:05) (18 - 22)  SpO2: 100% (19 Dec 2020 06:05) (93% - 100%)     PHYSICAL EXAM:  Constitutional: in NAD, age appropriate development, grimacing and opens her eyes to sternal rub   HEENT: NC/AT, B/L eye cataract pupils 2 mm very sluggishly reactive   Respiratory: No accessory respiratory muscle use  Abd: Soft, NT/ND     Extremities: withdraws to pain B/L UE. B/L LE AKA   Neurological: Aphasic, lethargic, not following verbal commands        LABS:                        12.2   14.98 )-----------( 181      ( 19 Dec 2020 02:44 )             40.8     12-19    160<H>  |  122<H>  |  44<H>  ----------------------------<  126<H>  4.6   |  26  |  0.7    Ca    9.3      19 Dec 2020 02:44    TPro  7.5  /  Alb  3.7  /  TBili  0.7  /  DBili  x   /  AST  17  /  ALT  21  /  AlkPhos  74  12-1           RADIOLOGY & ADDITIONAL STUDIES:     < from: CT Head No Cont (12.19.20 @ 05:00) >    EXAM:  CT BRAIN            PROCEDURE DATE:  12/19/2020            INTERPRETATION:  CLINICAL HISTORY/REASON FOR EXAM: Altered mental status.    TECHNIQUE: Multiple contiguous axial CT images of the head were obtained from the base of the skull to the vertex without administration of intravenous contrast. Sagittal, coronal and axial reformatted images were also obtained.    COMPARISON: None.    FINDINGS:    Prominent lateral, third and fourth ventricles superimposed upon a moderate degree of parenchymal volume loss.    Partially calcified 5 cm extra-axial retroclival soft tissue mass with associated bony erosion and mass effect upon the brainstem. No evidence of acute intracranial hemorrhage or large acute territorial infarct. No extra-axial collections.    Patchy hypoattenuation in the cerebral hemispheric white matter without mass effect is consistent with chronic microvascular ischemic changes.    No depressed calvarial fracture. Partially opacified mastoid air cells bilaterally. Imaged portions of the paranasal sinuses are clear. The orbits are symmetric and grossly normal in appearance.      IMPRESSION:    5 cm partially calcified retroclival soft tissue mass with associated bony erosion of the clivus and mass effect upon the brainstem. This may contribute to the ventricular dilatation. The differential for this finding includes chordoma versus meningioma. Further evaluation with contrast-enhanced MRI is recommended.    Partially opacified bilateral mastoid air cells.      Dr. Bell Vale discussed preliminary findings with CAROLA TOM on 12/19/2020 5:28 AM with readback.          BELL VALE M.D., RESIDENT RADIOLOGIST  This document has been electronically signed.  KATHERINE ORTEGA MD; Attending Radiologist  This document has been electronically signed. Dec 19 2020  5:32AM    < end of copied text >  
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   As per family, pt has been lethargic and not eating well since yesterday prompting the family to bring the patient to the hospital. At baseline pt is AAO x2, is awake, alert, speaks back to family members, watches TV but no oriented to time and place. Pt has a visiting home doctor who visits the patient once every month and visiting nurse who visits the patient once every week. At baseline pt tolerated regular meals and ensure supplements were added by visiting nurse, but recently family has noticed difficulty with swallowing and wanted to give soft puree diet.   Family is unsure about brain mass history but reports it was "benign mass" although patient has history of malignant brain tumor in her siblings. Also are not fully sure about the reason for b/l above knee amputations as per them the reason was "erosion of metallic prosthesis in in right ankle leading to gangrene that spread to left ankle ending up in b/l AKA".   Family denies any observed fever, SOB. n/v/d, weight changes or chest pain     In the ED, Temp 101.2, , /95, RR 22 saturating 93% on RA and titrated to 96% w/ 3L NC. Labs significant for elevated WBC 14.98, Na 160, proBNP 2382, CXR shows bilateral lung opacities with left lower lobe consolidation. CTH shows 5 cm partially calcified retroclival soft tissue mass with associated bony erosion of the clivus and mass effect upon the brainstem. This may contribute to the ventricular dilatation. The differential for this finding includes chordoma versus meningioma. Pt received 1L NS bolus, ceftriaxone / azithromycin, tylenol for fever in ED. Neurosurgery consulted and recommended Keppra and MRI of brain. Pt to be admitted to medicine for further workup.   (19 Dec 2020 11:08)      INFECTIOUS DISEASE HISTORY:  History as above.   Left lower lobe consolidation on CXR  Empirically on ceftriaxone/azithromycin    PAST MEDICAL & SURGICAL HISTORY:  Brain mass    COPD, mild    Above-knee amputee        FAMILY HISTORY  Unable to obtain family hx due to mental status    SOCIAL HISTORY  Social History:  former smoker, lives with daughter and grand daughter at home. (19 Dec 2020 11:08)        ROS  General: unable to obtain history secondary to patient's mental status     VITALS:  T(F): 99.2, Max: 99.4 (12-19-20 @ 07:40)  HR: 91  BP: 108/59  RR: 19Vital Signs Last 24 Hrs  T(C): 37.3 (20 Dec 2020 05:01), Max: 37.4 (19 Dec 2020 07:40)  T(F): 99.2 (20 Dec 2020 05:01), Max: 99.4 (19 Dec 2020 07:40)  HR: 91 (20 Dec 2020 05:01) (91 - 96)  BP: 108/59 (20 Dec 2020 05:01) (92/52 - 108/59)  BP(mean): --  RR: 19 (20 Dec 2020 05:01) (18 - 19)  SpO2: 97% (19 Dec 2020 10:23) (97% - 99%)    PHYSICAL EXAM:  Gen: NAD, resting in bed  HEENT: Normocephalic, atraumatic  Neck: supple, no lymphadenopathy  CV: Regular rate & regular rhythm  Lungs: decreased BS at bases, no fremitus  Abdomen: Soft, BS present  Ext: Warm, well perfused  Neuro: non focal, awake  Skin: no rash, no erythema  Lines: no phlebitis    TESTS & MEASUREMENTS:                        10.1   14.31 )-----------( 128      ( 20 Dec 2020 05:50 )             33.9     12-20    160<H>  |  125<H>  |  34<H>  ----------------------------<  90  3.5   |  24  |  0.6<L>    Ca    8.6      20 Dec 2020 05:50  Mg     2.3     12-20    TPro  6.0  /  Alb  3.1<L>  /  TBili  0.5  /  DBili  x   /  AST  9   /  ALT  13  /  AlkPhos  67  12-20    eGFR if Non African American: 77 mL/min/1.73M2 (12-20-20 @ 05:50)  eGFR if African American: 90 mL/min/1.73M2 (12-20-20 @ 05:50)  eGFR if Non African American: 77 mL/min/1.73M2 (12-20-20 @ 01:18)  eGFR if African American: 90 mL/min/1.73M2 (12-20-20 @ 01:18)  eGFR if Non African American: 77 mL/min/1.73M2 (12-19-20 @ 21:26)  eGFR if : 90 mL/min/1.73M2 (12-19-20 @ 21:26)  eGFR if : 85 mL/min/1.73M2 (12-19-20 @ 16:00)  eGFR if Non African American: 74 mL/min/1.73M2 (12-19-20 @ 16:00)    LIVER FUNCTIONS - ( 20 Dec 2020 05:50 )  Alb: 3.1 g/dL / Pro: 6.0 g/dL / ALK PHOS: 67 U/L / ALT: 13 U/L / AST: 9 U/L / GGT: x                 Blood Gas Venous - Lactate: 1.4 mmoL/L (12-19-20 @ 03:57)  Lactate, Blood: 1.8 mmol/L (12-19-20 @ 02:44)      INFECTIOUS DISEASES TESTING  COVID-19 PCR: NotDetec (12-19-20 @ 12:19)      RADIOLOGY & ADDITIONAL TESTS:  I have personally reviewed the last Chest xray  CXR      CT      CARDIOLOGY TESTING  12 Lead ECG:   Ventricular Rate 106 BPM    Atrial Rate 106 BPM    P-R Interval 154 ms    QRS Duration 76 ms    Q-T Interval 338 ms    QTC Calculation(Bazett) 448 ms    P Axis 24 degrees    R Axis 6 degrees    T Axis 35 degrees    Diagnosis Line Sinus tachycardia  Voltage criteria for left ventricular hypertrophy  Abnormal ECG    Confirmed by YEFRI ORO MD ([id number]) on 12/19/2020 10:15:22 AM (12-19-20 @ 04:10)      MEDICATIONS  aspirin  chewable 81 Oral daily  chlorhexidine 4% Liquid 1 Topical <User Schedule>  dextrose 5%. 1000 IV Continuous <Continuous>  influenza   Vaccine 0.5 IntraMuscular once  levoFLOXacin IVPB 750 IV Intermittent every 24 hours  pantoprazole    Tablet 40 Oral before breakfast      Weight  Weight (kg): 38.6 (12-19-20 @ 02:23)    ANTIBIOTICS:  levoFLOXacin IVPB 750 milliGRAM(s) IV Intermittent every 24 hours      ALLERGIES:  penicillin (Anaphylaxis)

## 2020-12-20 NOTE — CONSULT NOTE ADULT - ASSESSMENT
ASSESSMENT  89 yo M with PMH of COPD, benign brain tumor, AKA who presents for AMS found to have pneumonia and retroclival mass     IMPRESSION  #Sepsis on admission (Fevers, WBC>12) secondary to CAP  #Brain mass  - CT Head No Cont (12.19.20 @ 05:00): 5 cm partially calcified retroclival soft tissue mass with associated bony erosion of the clivus and mass effect upon the brainstem. This may contribute to the ventricular dilatation. The differential for this finding includes chordoma versus meningioma. Further evaluation with contrast-enhanced MRI is recommended. Partially opacified bilateral mastoid air cells.      #COPD  #s/p AKA  #Hypernatremia  #Abx allergy: penicillin (Anaphylaxis) - tolerated 3rd generation cephalosporin     RECOMMENDATIONS  - can switch to ceftriaxone 1g daily and azithromycin 500 mg daily as patient tolerated dose of ceftriaxone  - please obtain urine legionella ag and urine strep antigen  - follow-up blood cultures and procalcitonin  - aspiration precautions    Please call or message on [employer] Teams if with any questions.  Spectra [phone]    This is a preliminary incomplete pended note, all final recommendations to follow after interview and examination of the patient.     ASSESSMENT  89 yo M with PMH of COPD, benign brain tumor, AKA who presents for AMS found to have pneumonia and retroclival mass     IMPRESSION  #Sepsis on admission (Fevers, WBC>12) secondary to CAP, aspiration pneumonia   #Brain mass  - CT Head No Cont (12.19.20 @ 05:00): 5 cm partially calcified retroclival soft tissue mass with associated bony erosion of the clivus and mass effect upon the brainstem. This may contribute to the ventricular dilatation. The differential for this finding includes chordoma versus meningioma. Further evaluation with contrast-enhanced MRI is recommended. Partially opacified bilateral mastoid air cells.      #COPD  #s/p AKA  #Hypernatremia  #Abx allergy: penicillin (Anaphylaxis) - tolerated 3rd generation cephalosporin     RECOMMENDATIONS  - can switch to ceftriaxone 1g daily and azithromycin 500 mg daily as patient tolerated dose of ceftriaxone  - please obtain urine legionella ag and urine strep antigen  - follow-up blood cultures and procalcitonin  - aspiration precautions    Please call or message on Microsoft Teams if with any questions.  Glzfyns [phone]

## 2020-12-21 LAB
A1C WITH ESTIMATED AVERAGE GLUCOSE RESULT: 5.6 % — SIGNIFICANT CHANGE UP (ref 4–5.6)
ANION GAP SERPL CALC-SCNC: 11 MMOL/L — SIGNIFICANT CHANGE UP (ref 7–14)
ANION GAP SERPL CALC-SCNC: 9 MMOL/L — SIGNIFICANT CHANGE UP (ref 7–14)
BUN SERPL-MCNC: 20 MG/DL — SIGNIFICANT CHANGE UP (ref 10–20)
BUN SERPL-MCNC: 25 MG/DL — HIGH (ref 10–20)
CALCIUM SERPL-MCNC: 7.9 MG/DL — LOW (ref 8.5–10.1)
CALCIUM SERPL-MCNC: 8.5 MG/DL — SIGNIFICANT CHANGE UP (ref 8.5–10.1)
CHLORIDE SERPL-SCNC: 111 MMOL/L — HIGH (ref 98–110)
CHLORIDE SERPL-SCNC: 119 MMOL/L — HIGH (ref 98–110)
CO2 SERPL-SCNC: 21 MMOL/L — SIGNIFICANT CHANGE UP (ref 17–32)
CO2 SERPL-SCNC: 21 MMOL/L — SIGNIFICANT CHANGE UP (ref 17–32)
CREAT SERPL-MCNC: 0.5 MG/DL — LOW (ref 0.7–1.5)
CREAT SERPL-MCNC: 0.5 MG/DL — LOW (ref 0.7–1.5)
ESTIMATED AVERAGE GLUCOSE: 114 MG/DL — SIGNIFICANT CHANGE UP (ref 68–114)
FOLATE SERPL-MCNC: 6.8 NG/ML — SIGNIFICANT CHANGE UP
GLUCOSE BLDC GLUCOMTR-MCNC: 113 MG/DL — HIGH (ref 70–99)
GLUCOSE SERPL-MCNC: 100 MG/DL — HIGH (ref 70–99)
GLUCOSE SERPL-MCNC: 114 MG/DL — HIGH (ref 70–99)
HCT VFR BLD CALC: 30.9 % — LOW (ref 37–47)
HGB BLD-MCNC: 9.1 G/DL — LOW (ref 12–16)
MCHC RBC-ENTMCNC: 26.8 PG — LOW (ref 27–31)
MCHC RBC-ENTMCNC: 29.4 G/DL — LOW (ref 32–37)
MCV RBC AUTO: 90.9 FL — SIGNIFICANT CHANGE UP (ref 81–99)
NRBC # BLD: 0 /100 WBCS — SIGNIFICANT CHANGE UP (ref 0–0)
PLATELET # BLD AUTO: 104 K/UL — LOW (ref 130–400)
POTASSIUM SERPL-MCNC: 3.7 MMOL/L — SIGNIFICANT CHANGE UP (ref 3.5–5)
POTASSIUM SERPL-MCNC: 3.8 MMOL/L — SIGNIFICANT CHANGE UP (ref 3.5–5)
POTASSIUM SERPL-SCNC: 3.7 MMOL/L — SIGNIFICANT CHANGE UP (ref 3.5–5)
POTASSIUM SERPL-SCNC: 3.8 MMOL/L — SIGNIFICANT CHANGE UP (ref 3.5–5)
RBC # BLD: 3.4 M/UL — LOW (ref 4.2–5.4)
RBC # FLD: 14.9 % — HIGH (ref 11.5–14.5)
SODIUM SERPL-SCNC: 141 MMOL/L — SIGNIFICANT CHANGE UP (ref 135–146)
SODIUM SERPL-SCNC: 151 MMOL/L — HIGH (ref 135–146)
TSH SERPL-MCNC: 1.58 UIU/ML — SIGNIFICANT CHANGE UP (ref 0.27–4.2)
VIT B12 SERPL-MCNC: 607 PG/ML — SIGNIFICANT CHANGE UP (ref 232–1245)
WBC # BLD: 11.32 K/UL — HIGH (ref 4.8–10.8)
WBC # FLD AUTO: 11.32 K/UL — HIGH (ref 4.8–10.8)

## 2020-12-21 PROCEDURE — 99233 SBSQ HOSP IP/OBS HIGH 50: CPT

## 2020-12-21 RX ORDER — SODIUM CHLORIDE 9 MG/ML
1000 INJECTION, SOLUTION INTRAVENOUS
Refills: 0 | Status: DISCONTINUED | OUTPATIENT
Start: 2020-12-21 | End: 2020-12-22

## 2020-12-21 RX ORDER — POTASSIUM CHLORIDE 20 MEQ
20 PACKET (EA) ORAL ONCE
Refills: 0 | Status: COMPLETED | OUTPATIENT
Start: 2020-12-21 | End: 2020-12-21

## 2020-12-21 RX ADMIN — AZITHROMYCIN 255 MILLIGRAM(S): 500 TABLET, FILM COATED ORAL at 14:19

## 2020-12-21 RX ADMIN — SODIUM CHLORIDE 75 MILLILITER(S): 9 INJECTION, SOLUTION INTRAVENOUS at 10:07

## 2020-12-21 RX ADMIN — Medication 81 MILLIGRAM(S): at 11:57

## 2020-12-21 RX ADMIN — SODIUM CHLORIDE 75 MILLILITER(S): 9 INJECTION, SOLUTION INTRAVENOUS at 03:07

## 2020-12-21 RX ADMIN — CEFTRIAXONE 100 MILLIGRAM(S): 500 INJECTION, POWDER, FOR SOLUTION INTRAMUSCULAR; INTRAVENOUS at 11:58

## 2020-12-21 RX ADMIN — Medication 50 MILLIEQUIVALENT(S): at 00:56

## 2020-12-21 NOTE — SWALLOW BEDSIDE ASSESSMENT ADULT - COMMENTS
+throat clear post intake pt received awake, on RA. pt's grand daughter present during assessment. +min verbalizations, +confusion. +audible congestion baseline.

## 2020-12-21 NOTE — DIETITIAN INITIAL EVALUATION ADULT. - OTHER CALCULATIONS
wt used: dosing 38.6 kg; Kcal: 2010-8042 (30-35 kcal/kg) suspected low BMI considered; Protein: 46-50 g (1.2-1.3 g/kg) same as above; Fluid: 1 mL/kcal or per LIP

## 2020-12-21 NOTE — DIETITIAN INITIAL EVALUATION ADULT. - RD TO REMAIN AVAILABLE
INTERVENTION: meals and snacks, medical food supplements, vitamin/mineral supplements, coordination of care. ME: RD to monitor diet order, energy intake, body composition, NFPF, glucose/renal/electrolyte profiles/yes

## 2020-12-21 NOTE — DIETITIAN INITIAL EVALUATION ADULT. - PHYSCIAL ASSESSMENT
unable to calculate BMI without height. Also note that pt has b/l AKA. Will attempt to obtain NFPE upon f/u if pt cooperates    No edema noted; Skin is WDL (12/20). other (specify)

## 2020-12-21 NOTE — PROGRESS NOTE ADULT - ASSESSMENT
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     # Acute metabolic encephalopathy (Brain mass vs sepsis secondary to aspiration PNA, vs Hypernatremia)  # Aspiration PNA / sepsis  - SIRS + on adnimsison  - CXR shows bilateral lung opacities with left lower lobe consolidation.  - rapid covid 12/9 negative  - c/w levofloxacin 750mg IV q24 as allergic to penicillin  - procalcitonin 0.12,    - Lactate Dehydrogenase, Serum (12.19.20 @ 16:00)    Lactate Dehydrogenase, Serum: 293: Hemolyzed. Interpret with caution  -D-Dimer Assay, Quantitative: 1010: Manufacturers recommended Cut off for VTE is 230 ng/ml D-DU ng/mL DDU (12.19.20 @ 16:00)  - Blood cultures collected, pending results  - pending collection UA/Urine culture   - ID consult pending     # Chronic brain mass - DDX chordoma vs meningioma  - CTH shows 5 cm partially calcified retroclival soft tissue mass with associated bony erosion of the clivus and mass effect upon the brainstem. This may contribute to the ventricular dilatation. The differential for this finding includes chordoma versus meningioma. Further evaluation with contrast-enhanced MRI is recommended.  - spoke w/ NSX attending - family doesn't want any procedure so no MRI or keppra at the moment  - EEG to r/o seizure  - monitor hypernatremia on D5W     # Hypernatremia possibly secondary to hypovolemia as elevated BUN  - unsure if acute vs chronic, no previous records available.   - urine osm, U Na, Cr, Uk, FeNa  - calculated water deficit is 1.1 L  - strict I and Os  - D5w @ 50cc, goal is to lower by 8mmol / day, 160 --> 155 (fluids stopped)--> 156 --> 160 (fluids restarted)  - f/u BMP q8hr     # HF w/ unknown ejection fraction ; CXR b/l opacities L > R, BNP elevated, looks dry on exam. will hold lasix for now due to hypernatremia.   # COPD - c/w home inhalers  # hx of AKA - PVD - c/w home ASA     Diet: NPO, speech and swallow   DVT Prophylaxis: n/a for now as brain mass   GI Prophylaxis: Protonix   Activity: IAT   IV Fluids: D5W  Dispo: Acute  Code Status: Fullcode, d/w daughter Comfort, she wants fullcode.         #Aspiration Pneumonia, sepsis present on admission: ID recommendations noted, aspiration precautions, awaiting speech and swallow for modified diet and precautions     #Metabolic encephalopathy likely from aspiration pneumonia vs Hypernatremia: continue maintenance fluid until speech and swallow seen, free water deficit of 2.5L, free water supplement 400cc/Q4H, check 20:00 BMP    #Chronic Retroclival mass: no further workup as per family and neurosurgery     #COPD (not on home O2) stable, Unspecified CHF: strict intake and outputs, no sign of overt volume overload, lasix on hold     Disposition: Acute.  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     # Acute metabolic encephalopathy (Brain mass vs sepsis secondary to aspiration PNA, vs Hypernatremia)  # Aspiration PNA / sepsis  - SIRS + on adnimsison  - CXR shows bilateral lung opacities with left lower lobe consolidation.  - rapid covid 12/9 negative  - c/w levofloxacin 750mg IV q24 as allergic to penicillin  - procalcitonin 0.12,    - Lactate Dehydrogenase, Serum (12.19.20 @ 16:00)    Lactate Dehydrogenase, Serum: 293: Hemolyzed. Interpret with caution  -D-Dimer Assay, Quantitative: 1010: Manufacturers recommended Cut off for VTE is 230 ng/ml D-DU ng/mL DDU (12.19.20 @ 16:00)  - Blood cultures collected, pending results  - pending collection UA/Urine culture   - ID following, recommend:       - ceftriaxone 1g daily and azithromycin 500 mg daily as patient tolerated dose of ceftriaxone     -  urine legionella ag and urine strep antigen     - follow-up blood cultures and procalcitonin     - aspiration precautions    # Chronic brain mass - DDX chordoma vs meningioma  - CTH shows 5 cm partially calcified retroclival soft tissue mass with associated bony erosion of the clivus and mass effect upon the brainstem. This may contribute to the ventricular dilatation. The differential for this finding includes chordoma versus meningioma. Further evaluation with contrast-enhanced MRI is recommended.  - spoke w/ NSX attending - family doesn't want any procedure so no MRI or keppra at the moment  - EEG to r/o seizure-->family refused EEG       # Hypernatremia possibly secondary to hypovolemia as elevated BUN  - monitor hypernatremia on D5W   - unsure if acute vs chronic, no previous records available.   - urine osm, U Na, Cr, Uk, FeNa  - calculated water deficit is 1.1 L  - strict I and Os  - D5w @ 50cc, goal is to lower by 8mmol / day, 159-->150-->151  - f/u BMP q8hr     # HF w/ unknown ejection fraction  - CXR b/l opacities L > R   - BNP elevated, looks dry on exam  -  will hold lasix for now due to hypernatremia.     # COPD   - c/w home inhalers    # hx of AKA   - creatinine stable at 0.5    #PVD   - c/w home ASA     Diet: NPO, speech and swallow: no PO intake, pt is at risk o aspiration, spoke to family regarding possibility of PEG tube placement. Family is ok with PEG and NGT.    DVT Prophylaxis: n/a for now as brain mass   GI Prophylaxis: Protonix   Activity: IAT   IV Fluids: D5W  Dispo: Acute  Code Status: Fullcode, d/w daughter Comfort, she wants fullcode.

## 2020-12-21 NOTE — DIETITIAN INITIAL EVALUATION ADULT. - FACTORS AFF FOOD INTAKE
Pt initially seen by SLP on 12/20 "moderate oral dysphagia for Dysphagia diet I puree consistency, Nectar-thickened liquids, thins. +throat clear post intake" + recommended NPO with alternate means nutrition/hydration vs followed up by SLP today (12/21) "mild oral dysphagia for Dysphagia diet I puree consistency, Honey-thickened Liquids with no overt s/s of aspiration/penetration. +throat clear post thins, Nectar-thickened liquids. pt not a candidate for solid at this time" + recommended pureed/honey thickened liquids. Per SLP noted, pt baseline diet is regular with thin liquids. Po intake currently unknown as diet has been advanced after RD assessment/not yet recorded per EMR. No BMs recorded since admission on 12/19- fecal incontinence noted./change in mental status

## 2020-12-21 NOTE — DIETITIAN INITIAL EVALUATION ADULT. - COLLABORATION WITH OTHER PROVIDERS
Attempted to discuss recs with Dr Escudero [phone]; line busy on first attempt, no response second attempt.

## 2020-12-21 NOTE — PROGRESS NOTE ADULT - SUBJECTIVE AND OBJECTIVE BOX
EDDIE ECHOLS 89y Female  MRN#: [id number]   Hospital Day: 2d    SUBJECTIVE  Patient is a 89y old Female who presents with a chief complaint of Altered mental status (20 Dec 2020 06:49)  Currently admitted to medicine with the primary diagnosis of Aspiration pneumonia      INTERVAL HPI AND OVERNIGHT EVENTS:  Patient was examined and seen at bedside. This morning she is resting comfortably in bed and reports no issues or overnight events.    REVIEW OF SYMPTOMS:  CONSTITUTIONAL: No weakness, fevers or chills; No headaches  EYES: No visual changes, eye pain, or discharge  ENT: No vertigo; No ear pain or change in hearing; No sore throat or difficulty swallowing  NECK: No pain or stiffness  RESPIRATORY: No cough, wheezing, or hemoptysis; No shortness of breath  CARDIOVASCULAR: No chest pain or palpitations  GASTROINTESTINAL: No abdominal or epigastric pain; No nausea, vomiting, or hematemesis; No diarrhea or constipation; No melena or hematochezia  GENITOURINARY: No dysuria, frequency or hematuria  MUSCULOSKELETAL: No joint pain, no muscle pain, no weakness  NEUROLOGICAL: No numbness or weakness  SKIN: No itching or rashes    OBJECTIVE  PAST MEDICAL & SURGICAL HISTORY  Brain mass    COPD, mild    Above-knee amputee      ALLERGIES:  penicillin (Anaphylaxis)    MEDICATIONS:  STANDING MEDICATIONS  aspirin  chewable 81 milliGRAM(s) Oral daily  azithromycin  IVPB 500 milliGRAM(s) IV Intermittent every 24 hours  cefTRIAXone   IVPB 1000 milliGRAM(s) IV Intermittent every 24 hours  cefTRIAXone   IVPB      chlorhexidine 4% Liquid 1 Application(s) Topical <User Schedule>  dextrose 5%. 1000 milliLiter(s) IV Continuous <Continuous>  influenza   Vaccine 0.5 milliLiter(s) IntraMuscular once  pantoprazole    Tablet 40 milliGRAM(s) Oral before breakfast    PRN MEDICATIONS  ALBUTerol    90 MICROgram(s) HFA Inhaler 1 Puff(s) Inhalation every 4 hours PRN      VITAL SIGNS: Last 24 Hours  T(C): 36.3 (21 Dec 2020 04:59), Max: 37.3 (20 Dec 2020 14:03)  T(F): 97.3 (21 Dec 2020 04:59), Max: 99.1 (20 Dec 2020 14:03)  HR: 84 (21 Dec 2020 04:59) (84 - 91)  BP: 92/55 (21 Dec 2020 04:59) (89/54 - 94/53)  BP(mean): --  RR: 19 (21 Dec 2020 04:59) (18 - 19)  SpO2: 95% (21 Dec 2020 08:45) (95% - 96%)    LABS:                        9.1    11.32 )-----------( 104      ( 21 Dec 2020 05:59 )             30.9     12-21    151<H>  |  119<H>  |  25<H>  ----------------------------<  100<H>  3.8   |  21  |  0.5<L>    Ca    8.5      21 Dec 2020 05:59  Mg     2.3     12-20    TPro  6.0  /  Alb  3.1<L>  /  TBili  0.5  /  DBili  x   /  AST  9   /  ALT  13  /  AlkPhos  67  12-20    PT/INR - ( 20 Dec 2020 05:50 )   PT: 16.20 sec;   INR: 1.41 ratio         PTT - ( 20 Dec 2020 05:50 )  PTT:24.8 sec          Culture - Blood (collected 19 Dec 2020 21:26)  Source: .Blood None  Preliminary Report (21 Dec 2020 07:01):    No growth to date.          RADIOLOGY:      PHYSICAL EXAM:  CONSTITUTIONAL: No acute distress, well-developed, well-groomed, AAOx3  HEAD: Atraumatic, normocephalic  EYES: EOM intact, PERRLA, conjunctiva and sclera clear  ENT: Supple, no masses, no thyromegaly, no bruits, no JVD; moist mucous membranes  PULMONARY: Clear to auscultation bilaterally; no wheezes, rales, or rhonchi  CARDIOVASCULAR: Regular rate and rhythm; no murmurs, rubs, or gallops  GASTROINTESTINAL: Soft, non-tender, non-distended; bowel sounds present  MUSCULOSKELETAL: 2+ peripheral pulses; no clubbing, no cyanosis, no edema  NEUROLOGY: non-focal  SKIN: No rashes or lesions; warm and dry   EDDIE ECHOLS 89y Female  MRN#: [id number]   Hospital Day: 2d    SUBJECTIVE  Patient is a 89y old Female who presents with a chief complaint of Altered mental status (20 Dec 2020 06:49)  Currently admitted to medicine with the primary diagnosis of Aspiration pneumonia      INTERVAL HPI AND OVERNIGHT EVENTS:  Patient was examined and seen at bedside. This morning she is resting comfortably in bed. Daughter in law at bedside. pt has been having poor po intake    REVIEW OF SYMPTOMS:  As per interval HPI    OBJECTIVE  PAST MEDICAL & SURGICAL HISTORY  Brain mass    COPD, mild    Above-knee amputee      ALLERGIES:  penicillin (Anaphylaxis)    MEDICATIONS:  STANDING MEDICATIONS  aspirin  chewable 81 milliGRAM(s) Oral daily  azithromycin  IVPB 500 milliGRAM(s) IV Intermittent every 24 hours  cefTRIAXone   IVPB 1000 milliGRAM(s) IV Intermittent every 24 hours  cefTRIAXone   IVPB      chlorhexidine 4% Liquid 1 Application(s) Topical <User Schedule>  dextrose 5%. 1000 milliLiter(s) IV Continuous <Continuous>  influenza   Vaccine 0.5 milliLiter(s) IntraMuscular once  pantoprazole    Tablet 40 milliGRAM(s) Oral before breakfast    PRN MEDICATIONS  ALBUTerol    90 MICROgram(s) HFA Inhaler 1 Puff(s) Inhalation every 4 hours PRN      VITAL SIGNS: Last 24 Hours  T(C): 36.3 (21 Dec 2020 04:59), Max: 37.3 (20 Dec 2020 14:03)  T(F): 97.3 (21 Dec 2020 04:59), Max: 99.1 (20 Dec 2020 14:03)  HR: 84 (21 Dec 2020 04:59) (84 - 91)  BP: 92/55 (21 Dec 2020 04:59) (89/54 - 94/53)  BP(mean): --  RR: 19 (21 Dec 2020 04:59) (18 - 19)  SpO2: 95% (21 Dec 2020 08:45) (95% - 96%)    LABS:                        9.1    11.32 )-----------( 104      ( 21 Dec 2020 05:59 )             30.9     12-21    151<H>  |  119<H>  |  25<H>  ----------------------------<  100<H>  3.8   |  21  |  0.5<L>    Ca    8.5      21 Dec 2020 05:59  Mg     2.3     12-20    TPro  6.0  /  Alb  3.1<L>  /  TBili  0.5  /  DBili  x   /  AST  9   /  ALT  13  /  AlkPhos  67  12-20    PT/INR - ( 20 Dec 2020 05:50 )   PT: 16.20 sec;   INR: 1.41 ratio         PTT - ( 20 Dec 2020 05:50 )  PTT:24.8 sec          Culture - Blood (collected 19 Dec 2020 21:26)  Source: .Blood None  Preliminary Report (21 Dec 2020 07:01):    No growth to date.          RADIOLOGY:      PHYSICAL EXAM:  CONSTITUTIONAL: No acute distress, non verbal during this exam   HEAD: Atraumatic, normocephalic  EYES: EOM intact, PERRLA, conjunctiva and sclera clear  ENT: Supple, no masses, no thyromegaly, no bruits, no JVD; moist mucous membranes  PULMONARY: Clear to auscultation bilaterally; no wheezes, rales, or rhonchi  CARDIOVASCULAR: Regular rate and rhythm; no murmurs, rubs, or gallops  GASTROINTESTINAL: Soft, non-tender, non-distended; bowel sounds present  MUSCULOSKELETAL: b/l AKA,  2+ peripheral pulses; no clubbing, no cyanosis, no edema  NEUROLOGY: non-focal  SKIN: No rashes or lesions; warm and dry

## 2020-12-21 NOTE — DIETITIAN INITIAL EVALUATION ADULT. - ADD RECOMMEND
diet order per SLP recommendations; order Ensure Pudding supplement q12h, consider ordering a daily MVI supplement, encourage po intake, provide assistance/encouragement with meals. Please obtain pt height.

## 2020-12-21 NOTE — DIETITIAN INITIAL EVALUATION ADULT. - REASON INDICATOR FOR ASSESSMENT
LOS seen early 2/2 suspected BMI <19. Unable to obtain ht, pt is confused/disoriented + no response from pt son, Parveen, @ [phone] or pt daughter, Comfort, @ [phone] x2 attempts each (both phones have busy signals). Nutrition hx deferred.

## 2020-12-21 NOTE — PROGRESS NOTE ADULT - SUBJECTIVE AND OBJECTIVE BOX
ONESIMO, EDDIE  89y  Female  ***My note supersedes ALL resident notes that I sign.  My corrections for their notes are in my note.***    I can be reached directly on [employer] [address]. My office number is [phone]. My personal cell number is [phone].    INTERVAL EVENTS: Here for f/u of PNA. Pt did speak a couple of words to me. She did follow some simple commands. She did eat a little bit for her grnd-dtr-in-law (at bedside). Pt cannot walk 2/2 b/l AKA. However, they do get her into WC at home. Pt can feed herself from time to time, but requires asst w/ most ADLs. Family says pt usually does not like to talk w/ med personnel, but does talk w/ them.    T(F): 97.5 (12-21-20 @ 12:36), Max: 98.4 (12-20-20 @ 20:19)  HR: 78 (12-21-20 @ 12:36) (78 - 88)  BP: 103/51 (12-21-20 @ 12:36) (92/55 - 103/51)  RR: 20 (12-21-20 @ 12:36) (18 - 20)  SpO2: 95% (12-21-20 @ 08:45) (95% - 96%)    Gen: NAD; seems lethargic; was arousable; did follow some commands  HEENT: PERRL, nose clr; off O2  Neck: no nodes, no JVD, thyroid nl  lungs: decr BS b/l; no crackles or wheeze  hrt: s1 s2 rrr no murmur  abd: soft, NT/ND, no HS megaly  ext: b/l aka, no c/c  neuro: lethargic, but arousable, did speak a couple fo words and followed simple commands; did raise b/l arms to a small degree (not fully) and she can move (slightly) both AKA stumps; seems weak now    LABS:                      9.1     (    90.9   11.32 )-----------( ---------      104      ( 21 Dec 2020 05:59 )             30.9    (    14.9     151   (   119   (   100      12-21-20 @ 05:59  ----------------------               3.8   (   21   (   25                             -----                        0.5  Ca  8.5   Mg  --    P   --     Sodium, Serum: 151 mmol/L (12-21-20 @ 05:59)  Sodium, Serum: 150 mmol/L (12-20-20 @ 22:32)  Sodium, Serum: 159 mmol/L (12-20-20 @ 11:45)  Sodium, Serum: 160 mmol/L (12-20-20 @ 05:50)  Sodium, Serum: 160 mmol/L (12-20-20 @ 01:18)  Sodium, Serum: 156 mmol/L (12-19-20 @ 21:26)  Sodium, Serum: 155 mmol/L (12-19-20 @ 16:00)  Sodium, Serum: 160 mmol/L (12-19-20 @ 02:44)    150   (   118   (   113      12-20-20 @ 22:32  ----------------------               3.4   (   22   (   27                             -----                        0.5  Ca  8.2   Mg  --    P   --     PT/INR - ( 20 Dec 2020 05:50 )   PT: 16.20 sec;   INR: 1.41 ratio    PTT - ( 20 Dec 2020 05:50 )  PTT: 24.8 sec    CAPILLARY BLOOD GLUCOSE  POCT Blood Glucose.: 109 (12-19-20 @ 05:47)  POCT Blood Glucose.: 113 (12-19-20 @ 03:08)    Culture - Blood (collected 12-19-20 @ 21:26)  Source: .Blood None  Preliminary Report (12-21-20 @ 07:01):    No growth to date.    RADIOLOGY & ADDITIONAL TESTS:  < from: Xray Chest 1 View-PORTABLE IMMEDIATE (12.19.20 @ 05:13) >  Impression:    Bilateral opacities..    < end of copied text >    < from: CT Head No Cont (12.19.20 @ 05:00) >  IMPRESSION:    5 cm partially calcified retroclival soft tissue mass with associated bony erosion of the clivus and mass effect upon the brainstem. This may contribute to the ventricular dilatation. The differential for this finding includes chordoma versus meningioma. Further evaluation with contrast-enhanced MRI is recommended.    Partially opacified bilateral mastoid air cells.    < end of copied text >    MEDICATIONS:  azithromycin  IVPB 500 milliGRAM(s) IV Intermittent every 24 hours  cefTRIAXone   IVPB 1000 milliGRAM(s) IV Intermittent every 24 hours  cefTRIAXone   IVPB        ALBUTerol    90 MICROgram(s) HFA Inhaler 1 Puff(s) Inhalation every 4 hours PRN  aspirin  chewable 81 milliGRAM(s) Oral daily  chlorhexidine 4% Liquid 1 Application(s) Topical <User Schedule>  dextrose 5%. 1000 milliLiter(s) IV Continuous <Continuous>  influenza   Vaccine 0.5 milliLiter(s) IntraMuscular once  pantoprazole    Tablet 40 milliGRAM(s) Oral before breakfast

## 2020-12-21 NOTE — DIETITIAN INITIAL EVALUATION ADULT. - OTHER INFO
Pt admitted d/t aspiration pneumonia, AMS and brain mass. CXR shows b/l lung opacities with left lower lobe consolidation. Chronic brain mass- differential dx chordoma vs meningioma, further evaluation with contrast-enhanced MRI is recommended- per family do not want procedurem therefore no MRI or keppra at this time. EEG to r/o seizures- family refused EEG.

## 2020-12-21 NOTE — DIETITIAN INITIAL EVALUATION ADULT. - ORAL INTAKE PTA/DIET HISTORY
Unable to obtain for the above mentioned reasons. NKFA/intolerances per chart. No previous admissions per EMR.

## 2020-12-22 VITALS
TEMPERATURE: 98 F | RESPIRATION RATE: 20 BRPM | SYSTOLIC BLOOD PRESSURE: 106 MMHG | HEART RATE: 92 BPM | DIASTOLIC BLOOD PRESSURE: 54 MMHG

## 2020-12-22 LAB
ALBUMIN SERPL ELPH-MCNC: 2.8 G/DL — LOW (ref 3.5–5.2)
ALP SERPL-CCNC: 58 U/L — SIGNIFICANT CHANGE UP (ref 30–115)
ALT FLD-CCNC: 16 U/L — SIGNIFICANT CHANGE UP (ref 0–41)
ANION GAP SERPL CALC-SCNC: 9 MMOL/L — SIGNIFICANT CHANGE UP (ref 7–14)
AST SERPL-CCNC: 19 U/L — SIGNIFICANT CHANGE UP (ref 0–41)
BASOPHILS # BLD AUTO: 0.02 K/UL — SIGNIFICANT CHANGE UP (ref 0–0.2)
BASOPHILS NFR BLD AUTO: 0.2 % — SIGNIFICANT CHANGE UP (ref 0–1)
BILIRUB SERPL-MCNC: 0.3 MG/DL — SIGNIFICANT CHANGE UP (ref 0.2–1.2)
BUN SERPL-MCNC: 16 MG/DL — SIGNIFICANT CHANGE UP (ref 10–20)
CALCIUM SERPL-MCNC: 8.2 MG/DL — LOW (ref 8.5–10.1)
CHLORIDE SERPL-SCNC: 105 MMOL/L — SIGNIFICANT CHANGE UP (ref 98–110)
CO2 SERPL-SCNC: 23 MMOL/L — SIGNIFICANT CHANGE UP (ref 17–32)
CREAT SERPL-MCNC: <0.5 MG/DL — LOW (ref 0.7–1.5)
EOSINOPHIL # BLD AUTO: 0.33 K/UL — SIGNIFICANT CHANGE UP (ref 0–0.7)
EOSINOPHIL NFR BLD AUTO: 3.6 % — SIGNIFICANT CHANGE UP (ref 0–8)
GLUCOSE SERPL-MCNC: 107 MG/DL — HIGH (ref 70–99)
HCT VFR BLD CALC: 31.4 % — LOW (ref 37–47)
HGB BLD-MCNC: 9.7 G/DL — LOW (ref 12–16)
IMM GRANULOCYTES NFR BLD AUTO: 0.8 % — HIGH (ref 0.1–0.3)
LEGIONELLA AG UR QL: NEGATIVE — SIGNIFICANT CHANGE UP
LYMPHOCYTES # BLD AUTO: 1.84 K/UL — SIGNIFICANT CHANGE UP (ref 1.2–3.4)
LYMPHOCYTES # BLD AUTO: 19.9 % — LOW (ref 20.5–51.1)
MAGNESIUM SERPL-MCNC: 1.8 MG/DL — SIGNIFICANT CHANGE UP (ref 1.8–2.4)
MCHC RBC-ENTMCNC: 27.4 PG — SIGNIFICANT CHANGE UP (ref 27–31)
MCHC RBC-ENTMCNC: 30.9 G/DL — LOW (ref 32–37)
MCV RBC AUTO: 88.7 FL — SIGNIFICANT CHANGE UP (ref 81–99)
MONOCYTES # BLD AUTO: 0.52 K/UL — SIGNIFICANT CHANGE UP (ref 0.1–0.6)
MONOCYTES NFR BLD AUTO: 5.6 % — SIGNIFICANT CHANGE UP (ref 1.7–9.3)
NEUTROPHILS # BLD AUTO: 6.48 K/UL — SIGNIFICANT CHANGE UP (ref 1.4–6.5)
NEUTROPHILS NFR BLD AUTO: 69.9 % — SIGNIFICANT CHANGE UP (ref 42.2–75.2)
NRBC # BLD: 0 /100 WBCS — SIGNIFICANT CHANGE UP (ref 0–0)
PLATELET # BLD AUTO: 126 K/UL — LOW (ref 130–400)
POTASSIUM SERPL-MCNC: 3.8 MMOL/L — SIGNIFICANT CHANGE UP (ref 3.5–5)
POTASSIUM SERPL-SCNC: 3.8 MMOL/L — SIGNIFICANT CHANGE UP (ref 3.5–5)
PROT SERPL-MCNC: 5.6 G/DL — LOW (ref 6–8)
RBC # BLD: 3.54 M/UL — LOW (ref 4.2–5.4)
RBC # FLD: 14.3 % — SIGNIFICANT CHANGE UP (ref 11.5–14.5)
SODIUM SERPL-SCNC: 137 MMOL/L — SIGNIFICANT CHANGE UP (ref 135–146)
WBC # BLD: 9.26 K/UL — SIGNIFICANT CHANGE UP (ref 4.8–10.8)
WBC # FLD AUTO: 9.26 K/UL — SIGNIFICANT CHANGE UP (ref 4.8–10.8)

## 2020-12-22 PROCEDURE — 99239 HOSP IP/OBS DSCHRG MGMT >30: CPT

## 2020-12-22 RX ORDER — CEFPODOXIME PROXETIL 100 MG
1 TABLET ORAL
Qty: 14 | Refills: 0
Start: 2020-12-22 | End: 2020-12-28

## 2020-12-22 RX ORDER — AZITHROMYCIN 500 MG/1
1 TABLET, FILM COATED ORAL
Qty: 5 | Refills: 0
Start: 2020-12-22 | End: 2020-12-26

## 2020-12-22 RX ORDER — ACETAMINOPHEN 500 MG
650 TABLET ORAL ONCE
Refills: 0 | Status: COMPLETED | OUTPATIENT
Start: 2020-12-22 | End: 2020-12-22

## 2020-12-22 RX ADMIN — Medication 81 MILLIGRAM(S): at 11:54

## 2020-12-22 RX ADMIN — Medication 650 MILLIGRAM(S): at 13:51

## 2020-12-22 RX ADMIN — CEFTRIAXONE 100 MILLIGRAM(S): 500 INJECTION, POWDER, FOR SOLUTION INTRAMUSCULAR; INTRAVENOUS at 11:53

## 2020-12-22 RX ADMIN — CHLORHEXIDINE GLUCONATE 1 APPLICATION(S): 213 SOLUTION TOPICAL at 06:21

## 2020-12-22 RX ADMIN — AZITHROMYCIN 255 MILLIGRAM(S): 500 TABLET, FILM COATED ORAL at 12:45

## 2020-12-22 NOTE — SWALLOW BEDSIDE ASSESSMENT ADULT - NS ASR SWALLOW FINDINGS DISCUS
AYDEN Jones MD/Physician/Nursing/Patient/Family
RN and MD aware
AYDEN Anderson MD/Physician/Nursing/Patient/Family

## 2020-12-22 NOTE — PROGRESS NOTE ADULT - SUBJECTIVE AND OBJECTIVE BOX
ONESIMO, EDDIE  89y, Female  Allergy: penicillin (Anaphylaxis)      LOS  3d    CHIEF COMPLAINT: Altered mental status (22 Dec 2020 13:55)      INTERVAL EVENTS/HPI  - No acute events overnight  - T(F): , Max: 98.2 (12-22-20 @ 09:03)  - Denies any worsening symptoms  - Tolerating medication  - WBC Count: 9.26 (12-22-20 @ 09:01)  WBC Count: 11.32 (12-21-20 @ 05:59)     - Creatinine, Serum: <0.5 (12-22-20 @ 09:01)  Creatinine, Serum: 0.5 (12-21-20 @ 22:21)       ROS  General: Denies rigors, nightsweats  HEENT: Denies headache, rhinorrhea, sore throat, eye pain  CV: Denies CP, palpitations  PULM: Denies wheezing, hemoptysis  GI: Denies hematemesis, hematochezia, melena  : Denies discharge, hematuria  MSK: Denies arthralgias, myalgias  SKIN: Denies rash, lesions  NEURO: Denies paresthesias, weakness  PSYCH: Denies depression, anxiety    VITALS:  T(F): 98.2, Max: 98.2 (12-22-20 @ 09:03)  HR: 95  BP: 116/68  RR: 18Vital Signs Last 24 Hrs  T(C): 36.8 (22 Dec 2020 09:03), Max: 36.8 (22 Dec 2020 09:03)  T(F): 98.2 (22 Dec 2020 09:03), Max: 98.2 (22 Dec 2020 09:03)  HR: 95 (22 Dec 2020 09:03) (80 - 95)  BP: 116/68 (22 Dec 2020 09:03) (100/52 - 116/68)  BP(mean): --  RR: 18 (22 Dec 2020 09:03) (18 - 20)  SpO2: 94% (22 Dec 2020 09:03) (94% - 94%)    PHYSICAL EXAM:  Gen: NAD, resting in bed  HEENT: Normocephalic, atraumatic  Neck: supple, no lymphadenopathy  CV: Regular rate & regular rhythm  Lungs: decreased BS at bases, no fremitus  Abdomen: Soft, BS present  Ext: Warm, well perfused  Neuro: non focal, awake  Skin: no rash, no erythema  Lines: no phlebitis    FH: Non-contributory  Social Hx: Non-contributory    TESTS & MEASUREMENTS:                        9.7    9.26  )-----------( 126      ( 22 Dec 2020 09:01 )             31.4     12-22    137  |  105  |  16  ----------------------------<  107<H>  3.8   |  23  |  <0.5<L>    Ca    8.2<L>      22 Dec 2020 09:01  Mg     1.8     12-22    TPro  5.6<L>  /  Alb  2.8<L>  /  TBili  0.3  /  DBili  x   /  AST  19  /  ALT  16  /  AlkPhos  58  12-22    eGFR if Non African American: 89 mL/min/1.73M2 (12-22-20 @ 09:01)  eGFR if : 103 mL/min/1.73M2 (12-22-20 @ 09:01)  eGFR if Non African American: 82 mL/min/1.73M2 (12-21-20 @ 22:21)  eGFR if African American: 95 mL/min/1.73M2 (12-21-20 @ 22:21)    LIVER FUNCTIONS - ( 22 Dec 2020 09:01 )  Alb: 2.8 g/dL / Pro: 5.6 g/dL / ALK PHOS: 58 U/L / ALT: 16 U/L / AST: 19 U/L / GGT: x               Culture - Blood (collected 12-20-20 @ 05:50)  Source: .Blood None  Preliminary Report (12-21-20 @ 18:01):    No growth to date.    Culture - Blood (collected 12-19-20 @ 21:26)  Source: .Blood None  Preliminary Report (12-21-20 @ 07:01):    No growth to date.        Blood Gas Venous - Lactate: 1.4 mmoL/L (12-19-20 @ 03:57)  Lactate, Blood: 1.8 mmol/L (12-19-20 @ 02:44)      INFECTIOUS DISEASES TESTING  Legionella Antigen, Urine: Negative (12-21-20 @ 13:08)  Procalcitonin, Serum: 0.12 (12-19-20 @ 16:00)  COVID-19 PCR: NotDetec (12-19-20 @ 12:19)  Rapid RVP Result: NotDetec (12-19-20 @ 02:54)      INFLAMMATORY MARKERS  C-Reactive Protein, Serum: 14.54 mg/dL (12-19-20 @ 16:00)      RADIOLOGY & ADDITIONAL TESTS:  I have personally reviewed the last available Chest xray  CXR      CT      CARDIOLOGY TESTING  12 Lead ECG:   Ventricular Rate 106 BPM    Atrial Rate 106 BPM    P-R Interval 154 ms    QRS Duration 76 ms    Q-T Interval 338 ms    QTC Calculation(Bazett) 448 ms    P Axis 24 degrees    R Axis 6 degrees    T Axis 35 degrees    Diagnosis Line Sinus tachycardia  Voltage criteria for left ventricular hypertrophy  Abnormal ECG    Confirmed by YEFRI ORO MD ([id number]) on 12/19/2020 10:15:22 AM (12-19-20 @ 04:10)      MEDICATIONS  aspirin  chewable 81 Oral daily  azithromycin  IVPB 500 IV Intermittent every 24 hours  cefTRIAXone   IVPB 1000 IV Intermittent every 24 hours  cefTRIAXone   IVPB     chlorhexidine 4% Liquid 1 Topical <User Schedule>  influenza   Vaccine 0.5 IntraMuscular once  pantoprazole    Tablet 40 Oral before breakfast      WEIGHT  Weight (kg): 38.6 (12-19-20 @ 02:23)  Creatinine, Serum: <0.5 mg/dL (12-22-20 @ 09:01)  Creatinine, Serum: 0.5 mg/dL (12-21-20 @ 22:21)      ANTIBIOTICS:  azithromycin  IVPB 500 milliGRAM(s) IV Intermittent every 24 hours  cefTRIAXone   IVPB 1000 milliGRAM(s) IV Intermittent every 24 hours  cefTRIAXone   IVPB          All available historical records have been reviewed

## 2020-12-22 NOTE — SWALLOW BEDSIDE ASSESSMENT ADULT - SWALLOW EVAL: DIAGNOSIS
No s/s aspiration/penetration for puree and nectar thick liquids, moderate oral dysphagia
mild oral dysphagia for Dysphagia diet I puree consistency, Honey-thickened Liquids with no overt s/s of aspiration/penetration. +throat clear post thins, Nectar-thickened liquids. pt not a candidate for solid at this time
moderate oral dysphagia for Dysphagia diet I puree consistency, Nectar-thickened liquids, thins. +throat clear post intake

## 2020-12-22 NOTE — DISCHARGE NOTE NURSING/CASE MANAGEMENT/SOCIAL WORK - PATIENT PORTAL LINK FT
You can access the FollowMyHealth Patient Portal offered by [facility] by registering at the following website: [email or web address]. By joining [employer]’s FollowMyHealth portal, you will also be able to view your health information using other applications (apps) compatible with our system.

## 2020-12-22 NOTE — DISCHARGE NOTE PROVIDER - CARE PROVIDERS DIRECT ADDRESSES
[email or web address]

## 2020-12-22 NOTE — DISCHARGE NOTE PROVIDER - NSDCMRMEDTOKEN_GEN_ALL_CORE_FT
aspirin 81 mg oral tablet, chewable: 1 tab(s) orally once a day  Flovent 110 mcg/inh inhalation aerosol with adapter: 1 puff(s) inhaled 2 times a day  Lasix 20 mg oral tablet: 1 tab(s) orally once a day  omeprazole 40 mg oral delayed release capsule: 1 cap(s) orally once a day   aspirin 81 mg oral tablet, chewable: 1 tab(s) orally once a day  azithromycin 250 mg oral tablet: 1 tab(s) orally every 24 hours   cefpodoxime 200 mg oral tablet: 1 tab(s) orally every 12 hours   Flovent 110 mcg/inh inhalation aerosol with adapter: 1 puff(s) inhaled 2 times a day  Lasix 20 mg oral tablet: 1 tab(s) orally once a day  omeprazole 40 mg oral delayed release capsule: 1 cap(s) orally once a day

## 2020-12-22 NOTE — SWALLOW BEDSIDE ASSESSMENT ADULT - ORAL PHASE
Delayed oral transit time
Decreased anterior-posterior movement of the bolus/Delayed oral transit time
Delayed oral transit time

## 2020-12-22 NOTE — SWALLOW BEDSIDE ASSESSMENT ADULT - SLP GENERAL OBSERVATIONS
Pt awake, granddaughter at bedside. Pt in no apparent pain
lethargic with global weakness
lethargic with global weakness

## 2020-12-22 NOTE — DISCHARGE NOTE PROVIDER - CARE PROVIDER_API CALL
Christen Vance)  Internal Medicine  [address] [place] [address]  Phone: [phone]  Fax: [phone]  Follow Up Time:     Judith Shell)  Surgical Physicians  [address] [place] [address], [address]  [place] [address]  Phone: [phone]  Fax: [phone]  Follow Up Time:

## 2020-12-22 NOTE — SWALLOW BEDSIDE ASSESSMENT ADULT - SLP PERTINENT HISTORY OF CURRENT PROBLEM
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 CXR shows bilateral lung opacities with left lower lobe consolidation. CTH shows 5 cm partially calcified retroclival soft tissue mass with associated bony erosion of the clivus and mass effect upon the brainstem.

## 2020-12-22 NOTE — SWALLOW BEDSIDE ASSESSMENT ADULT - NS SPL SWALLOW CLINIC TRIAL FT
+ toleration
moderate oral dysphagia   +throat clear post intake
mild oral dysphagia with no overt s/s of aspiration/penetration

## 2020-12-22 NOTE — DISCHARGE NOTE PROVIDER - NSDCCPCAREPLAN_GEN_ALL_CORE_FT
PRINCIPAL DISCHARGE DIAGNOSIS  Diagnosis: Aspiration pneumonia  Assessment and Plan of Treatment: You were admitted to the hospital after experiencing confusion, poor appetitie and changes in mental status. On admission you were noted to have a fever and elevated heart rate. Your lab work was significant for elevated white cell counts due to infection. You chest xray showed findings consistent with aspiration pneumonia (pneumonia caused by aspiration of food particles. You were seen by our infectious disease specialist who recommended antibiotics to treat your pneumonia. You recieved antibiotics intravenously during your stay. Please follow up with your primary care doctor 1 week after discharge. Please take your medications as prescribed.         SECONDARY DISCHARGE DIAGNOSES  Diagnosis: Brain mass  Assessment and Plan of Treatment: On admission you were noted to have a brain mass on imaging. We were unable to perform MRI, EEG, or any invasive procedure as per your family's request. Please follow up with neurosurgery 1-2 week(s) after discharge from the [facility]. Please take your medications as prescribed.    Diagnosis: High sodium levels  Assessment and Plan of Treatment: Upon arrival you were noted to have high sodium levels most likely due to dehydration. Please remain hydrated and drink adequate amounts of fluids during the day. Please follow up with your primary care doctor in 1 week after discharge from the hospital and take your medications as prescribed.

## 2020-12-22 NOTE — PROGRESS NOTE ADULT - ASSESSMENT
# sepsis present on admission 2/2 PNA (poss aspiration vs comm acquired); thrombocytopenia (improving) 2/2 sepsis  ID noted  aspiration precautions  speech and swallow; puree and honey-thick liquids; HOB up as much as possible when feeding; 1:1 feeds; make sure pt is well awake when feeding (I spoke w/ family at bedside)   abx: rocephin 1gm iv q24 + azithro 500mg iv q24 - upon d/c, use vantin 200mg po q12 for 1 wk and azithro 250mg po q24 (to complete 5 days of azithro)  bld cx neg x2  f/u urine cx - can be post d/c  Urine Leg Ag neg  f/u U Strep Ag's - can be post d/c  off droplet precautions (COVID neg x2 on 12/19)    # Metabolic encephalopathy (resolved) likely from pneumonia and hypernatremia (resolved)  seems brain tumor w/ mass effect on brain stem is not contributing that much to mental status dysfxn as pt is quite better and back to baseline  can hold off on REEG  IVFs: d/c  hold lasix - would only use prn stump swelling at home going forwards    # 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)  family declined any further imaging or interventions  NSx noted    # COPD (not on home O2) stable  family gives albuterol q8 at home - cont  can start flovent again at home    # Unspecified CHF  echo will not change mngmt - family is more interested in getting pt home for [place]     # normo an of chr dz  no further w/u    # b/l AKA - hx of gangrenous limbs  stumps look fine  no further w/u needed    # DVT ppx: SCDs    # GI ppx: PPI po    # GOC: family still wants full code; though they are limiting some medical interventions (especially anything invasive); ultimately, they would like her back home    # spoke w/ grnddtr (by phone) and her wife (at bedside): gave update; they feel pt back to full baseline; they would like her home today (seems reasonable)    Dispo: abx as above; feed carefully; can try to d/c home today via ambulance (family heavily requesting)     Overall, prog is poor given co-morbidities and very advanced age. Family aware (but remain hopeful).

## 2020-12-22 NOTE — SWALLOW BEDSIDE ASSESSMENT ADULT - SWALLOW EVAL: RECOMMENDED DIET
Dysphagia 1 puree and nectar thick liquids
Dysphagia diet I puree consistency, Honey-thickened Liquids 1:1
NPO with alternate means of nutrition/hydration

## 2020-12-22 NOTE — DISCHARGE NOTE PROVIDER - HOSPITAL COURSE
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  As per family, pt has been lethargic and not eating well since yesterday prompting the family to bring the patient to the hospital. At baseline pt is AAO x2, is awake, alert, speaks back to family members, watches TV but no oriented to time and place. Pt has a visiting home doctor who visits the patient once every month and visiting nurse who visits the patient once every week. At baseline pt tolerated regular meals and ensure supplements were added by visiting nurse, but recently family has noticed difficulty with swallowing and wanted to give soft puree diet.  Family is unsure about brain mass history but reports it was "benign mass" although patient has history of malignant brain tumor in her siblings. Also are not fully sure about the reason for b/l above knee amputations as per them the reason was "erosion of metallic prosthesis in in right ankle leading to gangrene that spread to left ankle ending up in b/l AKA". Family denies any observed fever, SOB. n/v/d, weight changes or chest pain. In the ED, Temp 101.2, , /95, RR 22 saturating 93% on RA and titrated to 96% w/ 3L NC. Labs significant for elevated WBC 14.98, Na 160, proBNP 2382, CXR shows bilateral lung opacities with left lower lobe consolidation. CTH shows 5 cm partially calcified retroclival soft tissue mass with associated bony erosion of the clivus and mass effect upon the brainstem. This may contribute to the ventricular dilatation. The differential for this finding includes chordoma versus meningioma. Pt received 1L NS bolus, ceftriaxone / azithromycin, tylenol for fever in ED. Neurosurgery consulted and recommended Keppra and MRI of brain. Patient's family refused MRI of the brain, EEG and keppra.         # Acute metabolic encephalopathy (Brain mass vs sepsis secondary to aspiration PNA, vs Hypernatremia)  # Aspiration PNA / sepsis  - SIRS + on adnimsison  - CXR shows bilateral lung opacities with left lower lobe consolidation.  - rapid covid 12/9 negative  - c/w levofloxacin 750mg IV q24 as in patient (allergic to penicillin)  - procalcitonin 0.12,    - Lactate Dehydrogenase, Serum (12.19.20 @ 16:00)    Lactate Dehydrogenase, Serum: 293: Hemolyzed. Interpret with caution  -D-Dimer Assay, Quantitative: 1010: Manufacturers recommended Cut off for VTE is 230 ng/ml D-DU ng/mL DDU (12.19.20 @ 16:00)  - Blood cultures collected, pending results  - pending collection UA/Urine culture   - ID following, recommend:       - ceftriaxone 1g daily and azithromycin 500 mg daily as patient tolerated dose of ceftriaxone     - urine legionella ag and urine strep antigen     - follow-up blood cultures and procalcitonin     - aspiration precautions    # Chronic brain mass - DDX chordoma vs meningioma  - CTH shows 5 cm partially calcified retroclival soft tissue mass with associated bony erosion of the clivus and mass effect upon the brainstem. This may contribute to the ventricular dilatation. The differential for this finding includes chordoma versus meningioma. Further evaluation with contrast-enhanced MRI is recommended.  - spoke w/ NSX attending - family doesn't want any procedure so no MRI or keppra at the moment  - EEG recommended by neurosurgery to r/o seizure-->family refused EEG       # Hypernatremia possibly secondary to hypovolemia as elevated BUN  - monitor hypernatremia on D5W   - unsure if acute vs chronic, no previous records available.   - was on D5w @ 50cc, goal: to lower by 8mmol / day, 159-->150-->151-->141 upon discharge         # HF w/ unknown ejection fraction  - CXR b/l opacities L > R   - BNP elevated, looks dry on exam  - lasix held during stay due to hypernatremia.     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  As per family, pt has been lethargic and not eating well since yesterday prompting the family to bring the patient to the hospital. At baseline pt is AAO x2, is awake, alert, speaks back to family members, watches TV but no oriented to time and place. Pt has a visiting home doctor who visits the patient once every month and visiting nurse who visits the patient once every week. At baseline pt tolerated regular meals and ensure supplements were added by visiting nurse, but recently family has noticed difficulty with swallowing and wanted to give soft puree diet.  Family is unsure about brain mass history but reports it was "benign mass" although patient has history of malignant brain tumor in her siblings. Also are not fully sure about the reason for b/l above knee amputations as per them the reason was "erosion of metallic prosthesis in in right ankle leading to gangrene that spread to left ankle ending up in b/l AKA". Family denies any observed fever, SOB. n/v/d, weight changes or chest pain. In the ED, Temp 101.2, , /95, RR 22 saturating 93% on RA and titrated to 96% w/ 3L NC. Labs significant for elevated WBC 14.98, Na 160, proBNP 2382, CXR shows bilateral lung opacities with left lower lobe consolidation. CTH shows 5 cm partially calcified retroclival soft tissue mass with associated bony erosion of the clivus and mass effect upon the brainstem. This may contribute to the ventricular dilatation. The differential for this finding includes chordoma versus meningioma. Pt received 1L NS bolus, ceftriaxone / azithromycin, tylenol for fever in ED. Neurosurgery consulted and recommended Keppra and MRI of brain. Patient's family refused MRI of the brain, EEG and keppra. Patient initially received levofloxacin 750mg IV q24 which was switched to rocephin and azithro as per ID recommendation for aspiration PNA. Patient can follow up with neurosurgery as outpt.          # Acute metabolic encephalopathy (Brain mass vs sepsis secondary to aspiration PNA, vs Hypernatremia)  # Aspiration PNA / sepsis  - SIRS + on adnimsison  - CXR shows bilateral lung opacities with left lower lobe consolidation.  - rapid covid 12/9 negative  - c/w levofloxacin 750mg IV q24 as in patient (allergic to penicillin)  - procalcitonin 0.12,    - Lactate Dehydrogenase, Serum (12.19.20 @ 16:00)    Lactate Dehydrogenase, Serum: 293: Hemolyzed. Interpret with caution  -D-Dimer Assay, Quantitative: 1010: Manufacturers recommended Cut off for VTE is 230 ng/ml D-DU ng/mL DDU (12.19.20 @ 16:00)  - Blood cultures collected, pending results  - pending collection UA/Urine culture   - ID following, recommend:       - ceftriaxone 1g daily and azithromycin 500 mg daily as patient tolerated dose of ceftriaxone     - urine legionella ag and urine strep antigen     - follow-up blood cultures and procalcitonin     - aspiration precautions    # Chronic brain mass - DDX chordoma vs meningioma  - CTH shows 5 cm partially calcified retroclival soft tissue mass with associated bony erosion of the clivus and mass effect upon the brainstem. This may contribute to the ventricular dilatation. The differential for this finding includes chordoma versus meningioma. Further evaluation with contrast-enhanced MRI is recommended.  - spoke w/ NSX attending - family doesn't want any procedure so no MRI or keppra at the moment  - EEG recommended by neurosurgery to r/o seizure-->family refused EEG       # Hypernatremia possibly secondary to hypovolemia as elevated BUN  - monitor hypernatremia on D5W   - unsure if acute vs chronic, no previous records available.   - was on D5w @ 50cc, goal: to lower by 8mmol / day, 159-->150-->151-->141 upon discharge         # HF w/ unknown ejection fraction  - CXR b/l opacities L > R   - BNP elevated, looks dry on exam  - lasix held during stay due to hypernatremia.     # COPD   - c/w home inhalers    # hx of AKA   - creatinine stable at 0.5    #PVD   - c/w home ASA

## 2020-12-22 NOTE — PROGRESS NOTE ADULT - SUBJECTIVE AND OBJECTIVE BOX
EDDIE ECHOLS  89y  Female  ***My note supersedes ALL resident notes that I sign.  My corrections for their notes are in my note.***    I can be reached directly on [id number]. My office number is [phone]. My personal cell number is [phone].    INTERVAL EVENTS: Here for f/u of PNA. Pt looks remarkably better today. Family would like pt home today, as she is back to baseline. Pt spoke to me, even smiled and made a joke. Pt eating and drinking again. Can swallow pills.    T(F): 98.2 (12-22-20 @ 09:03), Max: 98.2 (12-22-20 @ 09:03)  HR: 95 (12-22-20 @ 09:03) (80 - 95)  BP: 116/68 (12-22-20 @ 09:03) (100/52 - 116/68)  RR: 18 (12-22-20 @ 09:03) (18 - 20)  SpO2: 94% (12-22-20 @ 09:03) (94% - 94%)    Gen: NAD; much beter  HEENT: PERRL, nose clr; off O2  Neck: no nodes, no JVD, thyroid nl  lungs: decr BS b/l; no crackles or wheeze  hrt: s1 s2 rrr no murmur  abd: soft, NT/ND, no HS megaly  ext: b/l aka, no c/c  neuro: speaking better; fully awake and alert; follows commands; hard of hearing; can move arms fine;     LABS:                      9.7     (    88.7   9.26  )-----------( ---------      126      ( 22 Dec 2020 09:01 )             31.4    (    14.3     WBC Count: 9.26 K/uL (12-22-20 @ 09:01) - better  WBC Count: 11.32 K/uL (12-21-20 @ 05:59)  WBC Count: 14.31 K/uL (12-20-20 @ 05:50)  WBC Count: 14.98 K/uL (12-19-20 @ 02:44)    Platelet Count - Automated: 126 K/uL (12-22-20 @ 09:01)  Platelet Count - Automated: 104 K/uL (12-21-20 @ 05:59)  Platelet Count - Automated: 128 K/uL (12-20-20 @ 05:50)  Platelet Count - Automated: 181 K/uL (12-19-20 @ 02:44)    137   (   105   (   107      12-22-20 @ 09:01  ----------------------               3.8   (   23   (   16                             -----                        <0.5  Ca  8.2   Mg  1.8    P   --     141   (   111   (   114      12-21-20 @ 22:21  ----------------------               3.7   (   21   (   20                             -----                        0.5  Ca  7.9   Mg  --    P   --     LFT  5.6  (  0.3  (  19       12-22-20 @ 09:01  -------------------------  2.8  (  58  (  16    Culture - Blood (collected 12-20-20 @ 05:50)  Source: .Blood None  Preliminary Report (12-21-20 @ 18:01):    No growth to date.    Culture - Blood (collected 12-19-20 @ 21:26)  Source: .Blood None  Preliminary Report (12-21-20 @ 07:01):    No growth to date.    RADIOLOGY & ADDITIONAL TESTS:  < from: Xray Chest 1 View-PORTABLE IMMEDIATE (12.19.20 @ 05:13) >  Impression:    Bilateral opacities..    < end of copied text >      MEDICATIONS:  azithromycin  IVPB 500 milliGRAM(s) IV Intermittent every 24 hours  cefTRIAXone   IVPB 1000 milliGRAM(s) IV Intermittent every 24 hours  cefTRIAXone   IVPB        ALBUTerol    90 MICROgram(s) HFA Inhaler 1 Puff(s) Inhalation every 4 hours PRN  aspirin  chewable 81 milliGRAM(s) Oral daily  chlorhexidine 4% Liquid 1 Application(s) Topical <User Schedule>  influenza   Vaccine 0.5 milliLiter(s) IntraMuscular once  pantoprazole    Tablet 40 milliGRAM(s) Oral before breakfast

## 2020-12-23 LAB — S PNEUM AG UR QL: NEGATIVE — SIGNIFICANT CHANGE UP

## 2020-12-25 LAB
CULTURE RESULTS: SIGNIFICANT CHANGE UP
CULTURE RESULTS: SIGNIFICANT CHANGE UP
SPECIMEN SOURCE: SIGNIFICANT CHANGE UP
SPECIMEN SOURCE: SIGNIFICANT CHANGE UP

## 2020-12-30 DIAGNOSIS — J69.0 PNEUMONITIS DUE TO INHALATION OF FOOD AND VOMIT: ICD-10-CM

## 2020-12-30 DIAGNOSIS — I50.9 HEART FAILURE, UNSPECIFIED: ICD-10-CM

## 2020-12-30 DIAGNOSIS — R41.82 ALTERED MENTAL STATUS, UNSPECIFIED: ICD-10-CM

## 2020-12-30 DIAGNOSIS — E87.0 HYPEROSMOLALITY AND HYPERNATREMIA: ICD-10-CM

## 2020-12-30 DIAGNOSIS — E86.1 HYPOVOLEMIA: ICD-10-CM

## 2020-12-30 DIAGNOSIS — Z79.82 LONG TERM (CURRENT) USE OF ASPIRIN: ICD-10-CM

## 2020-12-30 DIAGNOSIS — G93.89 OTHER SPECIFIED DISORDERS OF BRAIN: ICD-10-CM

## 2020-12-30 DIAGNOSIS — I73.9 PERIPHERAL VASCULAR DISEASE, UNSPECIFIED: ICD-10-CM

## 2020-12-30 DIAGNOSIS — A41.9 SEPSIS, UNSPECIFIED ORGANISM: ICD-10-CM

## 2020-12-30 DIAGNOSIS — Z88.0 ALLERGY STATUS TO PENICILLIN: ICD-10-CM

## 2020-12-30 DIAGNOSIS — J44.9 CHRONIC OBSTRUCTIVE PULMONARY DISEASE, UNSPECIFIED: ICD-10-CM

## 2020-12-30 DIAGNOSIS — G93.40 ENCEPHALOPATHY, UNSPECIFIED: ICD-10-CM

## 2022-09-06 NOTE — PROGRESS NOTE ADULT - ASSESSMENT
# sepsis present on admission 2/2 PNA (poss aspiration vs comm acquired); thrombocytopenia 2/2 sepsis  ID noted  aspiration precautions  speech and swallow; puree and honey-thick liquids; HOB up as much as possible when feeding; 1:1 feeds; make sure pt is well awake when feeding (I spoke w/ family at bedside)   abx: rocephin 1gm iv q24 + azithro 500mg iv q24  f/u bld/urine cx's  str cath for Urine Leg and Strep Ag's  can d/c droplet precautions (COVID neg x2 on 12/19)    # Metabolic encephalopathy likely from pneumonia and hypernatremia  it is possible that brain tumor w/ mass effect on brain stem could be contributing to MS dysfxn as well (see below)  consider REEG  IVFs: D5 75/hr  hold lasix  daily or q12 BMP until Na fixed (can do oral hydration as well, if pt awake enough)    # 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)  family declined any further imaging or interventions  NSx noted    # COPD (not on home O2) stable  family gives albuterol q8 at home - will order  can start flovent again at home    # Unspecified CHF  echo will not change mngmt - family is more interested in getting pt home for Ramy (as this might be her last one)    # normo an of chr dz  no further w/u    # b/l AKA - hx of gangrenous limbs  stumps look fine  no further w/u needed    # DVT ppx: SCDs    # GI ppx: PPI po    # GOC: family still wants full code; though is limiting some medical interventions (especially anything invasive); ultimately, they would like her back home    Dispo: IV abx as above; feed carefully; fix Na; IVFs; CBC/BMP q24; f/u Cx's; f/u U Leg/Strp Ag  will try to get pt home in 48 hrs (family's wishes)    Overall, prog is very poor given co-morbidities and very advanced age. Family aware (but remain hopeful). Name band;